# Patient Record
Sex: FEMALE | Race: WHITE | Employment: UNEMPLOYED | URBAN - METROPOLITAN AREA
[De-identification: names, ages, dates, MRNs, and addresses within clinical notes are randomized per-mention and may not be internally consistent; named-entity substitution may affect disease eponyms.]

---

## 2018-05-11 ENCOUNTER — APPOINTMENT (OUTPATIENT)
Dept: GENERAL RADIOLOGY | Age: 53
End: 2018-05-11
Payer: COMMERCIAL

## 2018-05-11 ENCOUNTER — HOSPITAL ENCOUNTER (EMERGENCY)
Age: 53
Discharge: HOME | End: 2018-05-11
Payer: COMMERCIAL

## 2018-05-11 VITALS
BODY MASS INDEX: 47.8 KG/M2 | TEMPERATURE: 98.1 F | RESPIRATION RATE: 16 BRPM | SYSTOLIC BLOOD PRESSURE: 160 MMHG | HEIGHT: 64 IN | OXYGEN SATURATION: 98 % | WEIGHT: 280 LBS | DIASTOLIC BLOOD PRESSURE: 66 MMHG | HEART RATE: 84 BPM

## 2018-05-11 DIAGNOSIS — S60.221A CONTUSION OF RIGHT HAND, INITIAL ENCOUNTER: Primary | ICD-10-CM

## 2018-05-11 PROCEDURE — 73110 X-RAY EXAM OF WRIST: CPT

## 2018-05-11 PROCEDURE — 73130 X-RAY EXAM OF HAND: CPT

## 2018-05-11 PROCEDURE — 6370000000 HC RX 637 (ALT 250 FOR IP): Performed by: NURSE PRACTITIONER

## 2018-05-11 PROCEDURE — 99284 EMERGENCY DEPT VISIT MOD MDM: CPT

## 2018-05-11 RX ORDER — NAPROXEN 500 MG/1
500 TABLET ORAL 2 TIMES DAILY PRN
Qty: 14 TABLET | Refills: 0 | Status: SHIPPED | OUTPATIENT
Start: 2018-05-11 | End: 2019-01-05 | Stop reason: ALTCHOICE

## 2018-05-11 RX ORDER — IBUPROFEN 800 MG/1
800 TABLET ORAL ONCE
Status: COMPLETED | OUTPATIENT
Start: 2018-05-11 | End: 2018-05-11

## 2018-05-11 RX ORDER — CHOLECALCIFEROL (VITAMIN D3) 1250 MCG
50000 CAPSULE ORAL WEEKLY
COMMUNITY

## 2018-05-11 RX ORDER — ATORVASTATIN CALCIUM 10 MG/1
10 TABLET, FILM COATED ORAL DAILY
COMMUNITY
End: 2019-10-01

## 2018-05-11 RX ADMIN — IBUPROFEN 800 MG: 800 TABLET ORAL at 14:38

## 2018-05-11 ASSESSMENT — PAIN DESCRIPTION - PAIN TYPE: TYPE: ACUTE PAIN

## 2018-05-11 ASSESSMENT — PAIN DESCRIPTION - ORIENTATION: ORIENTATION: RIGHT

## 2018-05-11 ASSESSMENT — PAIN SCALES - GENERAL: PAINLEVEL_OUTOF10: 8

## 2018-11-19 ENCOUNTER — HOSPITAL ENCOUNTER (OUTPATIENT)
Age: 53
Discharge: HOME | End: 2018-11-19
Payer: COMMERCIAL

## 2018-11-19 LAB
ALBUMIN SERPL-MCNC: 3.8 G/DL (ref 3.5–5.2)
ALP BLD-CCNC: 80 U/L (ref 35–104)
ALT SERPL-CCNC: 9 U/L (ref 0–32)
ANION GAP SERPL CALCULATED.3IONS-SCNC: 16 MMOL/L (ref 7–16)
AST SERPL-CCNC: 11 U/L (ref 0–31)
BILIRUB SERPL-MCNC: 0.3 MG/DL (ref 0–1.2)
BUN BLDV-MCNC: 8 MG/DL (ref 6–20)
CALCIUM SERPL-MCNC: 9 MG/DL (ref 8.6–10.2)
CHLORIDE BLD-SCNC: 101 MMOL/L (ref 98–107)
CHOLESTEROL, FASTING: 167 MG/DL (ref 0–199)
CO2: 25 MMOL/L (ref 22–29)
CREAT SERPL-MCNC: 0.8 MG/DL (ref 0.5–1)
GFR AFRICAN AMERICAN: >60
GFR NON-AFRICAN AMERICAN: >60 ML/MIN/1.73
GLUCOSE FASTING: 106 MG/DL (ref 74–99)
HCT VFR BLD CALC: 40.8 % (ref 34–48)
HDLC SERPL-MCNC: 38 MG/DL
HEMOGLOBIN: 12.9 G/DL (ref 11.5–15.5)
LDL CHOLESTEROL CALCULATED: 52 MG/DL (ref 0–99)
MCH RBC QN AUTO: 25.7 PG (ref 26–35)
MCHC RBC AUTO-ENTMCNC: 31.6 % (ref 32–34.5)
MCV RBC AUTO: 81.3 FL (ref 80–99.9)
PDW BLD-RTO: 14.6 FL (ref 11.5–15)
PLATELET # BLD: 309 E9/L (ref 130–450)
PMV BLD AUTO: 10.4 FL (ref 7–12)
POTASSIUM SERPL-SCNC: 4.6 MMOL/L (ref 3.5–5)
RBC # BLD: 5.02 E12/L (ref 3.5–5.5)
SODIUM BLD-SCNC: 142 MMOL/L (ref 132–146)
T3 FREE: 3.1 PG/ML (ref 2–4.4)
T4 FREE: 1.03 NG/DL (ref 0.93–1.7)
TOTAL PROTEIN: 7.4 G/DL (ref 6.4–8.3)
TRIGLYCERIDE, FASTING: 386 MG/DL (ref 0–149)
TSH SERPL DL<=0.05 MIU/L-ACNC: 2.53 UIU/ML (ref 0.27–4.2)
VITAMIN D 25-HYDROXY: 28 NG/ML (ref 30–100)
VLDLC SERPL CALC-MCNC: 77 MG/DL
WBC # BLD: 6.5 E9/L (ref 4.5–11.5)

## 2018-11-19 PROCEDURE — 82306 VITAMIN D 25 HYDROXY: CPT

## 2018-11-19 PROCEDURE — 36415 COLL VENOUS BLD VENIPUNCTURE: CPT

## 2018-11-19 PROCEDURE — 85027 COMPLETE CBC AUTOMATED: CPT

## 2018-11-19 PROCEDURE — 80053 COMPREHEN METABOLIC PANEL: CPT

## 2018-11-19 PROCEDURE — 84439 ASSAY OF FREE THYROXINE: CPT

## 2018-11-19 PROCEDURE — 84481 FREE ASSAY (FT-3): CPT

## 2018-11-19 PROCEDURE — 80061 LIPID PANEL: CPT

## 2018-11-19 PROCEDURE — 84443 ASSAY THYROID STIM HORMONE: CPT

## 2019-01-05 ENCOUNTER — APPOINTMENT (OUTPATIENT)
Dept: GENERAL RADIOLOGY | Age: 54
End: 2019-01-05
Payer: COMMERCIAL

## 2019-01-05 ENCOUNTER — HOSPITAL ENCOUNTER (EMERGENCY)
Age: 54
Discharge: HOME | End: 2019-01-05
Attending: EMERGENCY MEDICINE
Payer: COMMERCIAL

## 2019-01-05 VITALS
RESPIRATION RATE: 18 BRPM | SYSTOLIC BLOOD PRESSURE: 158 MMHG | DIASTOLIC BLOOD PRESSURE: 95 MMHG | HEART RATE: 103 BPM | WEIGHT: 280 LBS | TEMPERATURE: 98.4 F | HEIGHT: 63 IN | BODY MASS INDEX: 49.61 KG/M2 | OXYGEN SATURATION: 96 %

## 2019-01-05 DIAGNOSIS — S93.602A SPRAIN OF LEFT FOOT, INITIAL ENCOUNTER: ICD-10-CM

## 2019-01-05 DIAGNOSIS — S93.402A SPRAIN OF LEFT ANKLE, UNSPECIFIED LIGAMENT, INITIAL ENCOUNTER: Primary | ICD-10-CM

## 2019-01-05 PROCEDURE — 73630 X-RAY EXAM OF FOOT: CPT

## 2019-01-05 PROCEDURE — 99284 EMERGENCY DEPT VISIT MOD MDM: CPT

## 2019-01-05 PROCEDURE — 73610 X-RAY EXAM OF ANKLE: CPT

## 2019-01-05 PROCEDURE — G0383 LEV 4 HOSP TYPE B ED VISIT: HCPCS

## 2019-01-05 RX ORDER — IBUPROFEN 800 MG/1
800 TABLET ORAL EVERY 8 HOURS PRN
Qty: 30 TABLET | Refills: 0 | Status: SHIPPED | OUTPATIENT
Start: 2019-01-05 | End: 2019-10-01

## 2019-01-05 RX ORDER — DIVALPROEX SODIUM 250 MG/1
250 TABLET, EXTENDED RELEASE ORAL DAILY
COMMUNITY

## 2019-01-05 RX ORDER — LATANOPROST 50 UG/ML
1 SOLUTION/ DROPS OPHTHALMIC NIGHTLY
COMMUNITY
End: 2021-05-11

## 2019-01-05 ASSESSMENT — ENCOUNTER SYMPTOMS
SINUS PRESSURE: 0
COUGH: 0
EYE REDNESS: 0
DIARRHEA: 0
WHEEZING: 0
SHORTNESS OF BREATH: 0
ABDOMINAL DISTENTION: 0
NAUSEA: 0
VOMITING: 0
BACK PAIN: 0
SORE THROAT: 0
EYE PAIN: 0
EYE DISCHARGE: 0

## 2019-01-05 ASSESSMENT — PAIN DESCRIPTION - LOCATION: LOCATION: ANKLE;FOOT

## 2019-01-05 ASSESSMENT — PAIN DESCRIPTION - PAIN TYPE: TYPE: ACUTE PAIN

## 2019-01-05 ASSESSMENT — PAIN DESCRIPTION - ONSET: ONSET: SUDDEN

## 2019-01-05 ASSESSMENT — PAIN DESCRIPTION - FREQUENCY: FREQUENCY: CONTINUOUS

## 2019-01-05 ASSESSMENT — PAIN DESCRIPTION - PROGRESSION: CLINICAL_PROGRESSION: NOT CHANGED

## 2019-01-05 ASSESSMENT — PAIN SCALES - GENERAL: PAINLEVEL_OUTOF10: 8

## 2019-01-05 ASSESSMENT — PAIN DESCRIPTION - ORIENTATION: ORIENTATION: LEFT

## 2019-02-16 ENCOUNTER — HOSPITAL ENCOUNTER (OUTPATIENT)
Age: 54
Discharge: HOME | End: 2019-02-16
Payer: COMMERCIAL

## 2019-02-16 LAB — VITAMIN B-12: 464 PG/ML (ref 211–946)

## 2019-02-16 PROCEDURE — 36415 COLL VENOUS BLD VENIPUNCTURE: CPT

## 2019-02-16 PROCEDURE — 82607 VITAMIN B-12: CPT

## 2019-03-16 ENCOUNTER — HOSPITAL ENCOUNTER (OUTPATIENT)
Dept: GENERAL RADIOLOGY | Age: 54
Discharge: HOME | End: 2019-03-18
Payer: COMMERCIAL

## 2019-03-16 ENCOUNTER — HOSPITAL ENCOUNTER (OUTPATIENT)
Age: 54
Discharge: HOME | End: 2019-03-18
Payer: COMMERCIAL

## 2019-03-16 DIAGNOSIS — M54.2 CERVICALGIA: ICD-10-CM

## 2019-03-16 DIAGNOSIS — M54.5 LOW BACK PAIN, UNSPECIFIED BACK PAIN LATERALITY, UNSPECIFIED CHRONICITY, WITH SCIATICA PRESENCE UNSPECIFIED: ICD-10-CM

## 2019-03-16 PROCEDURE — 72100 X-RAY EXAM L-S SPINE 2/3 VWS: CPT

## 2019-03-16 PROCEDURE — 72050 X-RAY EXAM NECK SPINE 4/5VWS: CPT

## 2019-03-16 PROCEDURE — 72220 X-RAY EXAM SACRUM TAILBONE: CPT

## 2019-10-01 ENCOUNTER — HOSPITAL ENCOUNTER (EMERGENCY)
Age: 54
Discharge: HOME | End: 2019-10-01
Attending: EMERGENCY MEDICINE
Payer: COMMERCIAL

## 2019-10-01 VITALS
HEIGHT: 64 IN | RESPIRATION RATE: 18 BRPM | HEART RATE: 80 BPM | OXYGEN SATURATION: 98 % | SYSTOLIC BLOOD PRESSURE: 140 MMHG | BODY MASS INDEX: 45.75 KG/M2 | DIASTOLIC BLOOD PRESSURE: 82 MMHG | WEIGHT: 268 LBS | TEMPERATURE: 98.1 F

## 2019-10-01 DIAGNOSIS — H66.001 NON-RECURRENT ACUTE SUPPURATIVE OTITIS MEDIA OF RIGHT EAR WITHOUT SPONTANEOUS RUPTURE OF TYMPANIC MEMBRANE: ICD-10-CM

## 2019-10-01 DIAGNOSIS — H60.391 INFECTIVE OTITIS EXTERNA OF RIGHT EAR: Primary | ICD-10-CM

## 2019-10-01 PROCEDURE — G0381 LEV 2 HOSP TYPE B ED VISIT: HCPCS

## 2019-10-01 RX ORDER — OFLOXACIN 3 MG/ML
5 SOLUTION AURICULAR (OTIC) 3 TIMES DAILY
Qty: 7.5 ML | Refills: 0 | Status: SHIPPED | OUTPATIENT
Start: 2019-10-01 | End: 2019-10-11

## 2019-10-01 RX ORDER — LISINOPRIL 10 MG/1
10 TABLET ORAL DAILY
COMMUNITY

## 2019-10-01 RX ORDER — TRIAMCINOLONE ACETONIDE 5 MG/G
CREAM TOPICAL 3 TIMES DAILY
COMMUNITY
End: 2022-04-01

## 2019-10-01 RX ORDER — CHLORZOXAZONE 500 MG/1
500 TABLET ORAL 3 TIMES DAILY
COMMUNITY

## 2019-10-01 RX ORDER — PERPHENAZINE/AMITRIPTYLINE HCL 2 MG-25 MG
TABLET ORAL
COMMUNITY

## 2019-10-01 RX ORDER — DOXYCYCLINE HYCLATE 100 MG
100 TABLET ORAL 2 TIMES DAILY
Qty: 20 TABLET | Refills: 0 | Status: SHIPPED | OUTPATIENT
Start: 2019-10-01 | End: 2019-10-11

## 2019-10-01 ASSESSMENT — PAIN DESCRIPTION - LOCATION: LOCATION: EAR;JAW

## 2019-10-01 ASSESSMENT — PAIN DESCRIPTION - ONSET: ONSET: PROGRESSIVE

## 2019-10-01 ASSESSMENT — PAIN DESCRIPTION - ORIENTATION: ORIENTATION: RIGHT

## 2019-10-01 ASSESSMENT — PAIN DESCRIPTION - PROGRESSION: CLINICAL_PROGRESSION: GRADUALLY WORSENING

## 2019-10-01 ASSESSMENT — PAIN DESCRIPTION - FREQUENCY: FREQUENCY: CONTINUOUS

## 2019-10-01 ASSESSMENT — PAIN SCALES - GENERAL: PAINLEVEL_OUTOF10: 5

## 2019-10-01 ASSESSMENT — PAIN DESCRIPTION - DESCRIPTORS: DESCRIPTORS: ACHING;THROBBING

## 2019-10-01 ASSESSMENT — PAIN DESCRIPTION - PAIN TYPE: TYPE: ACUTE PAIN

## 2020-08-14 ENCOUNTER — TELEPHONE (OUTPATIENT)
Dept: ADMINISTRATIVE | Age: 55
End: 2020-08-14

## 2020-08-14 NOTE — TELEPHONE ENCOUNTER
Pt's caregiver called and requested to schedule pt with you as a new pt. Her current provider is Dr. Araceli Grigsby. She is requesting a female provider if possible. She has fibromyalgia, asthma, arthritis, and a stutter. She takes Tramadol, Gabapentin, and Ambien that is prescribed from pain management in [facility]. Informed caregiver unsure if able to schedule and would send encounter. Is pt able to establish with you?

## 2020-08-14 NOTE — TELEPHONE ENCOUNTER
The problem is there will come a time she will want me to fill her RX and I will not be able to. So I would say not at this time.  Try Leobardo Figueroa

## 2020-08-14 NOTE — TELEPHONE ENCOUNTER
Spoke with Tamanna Welch and informed him unable to schedule with Cyn Bush. Discussed reasoning of not being able to schedule. He is requesting to possibly schedule pt with Dr. Pete Diaz. Is pt able to establish with him?

## 2020-08-19 NOTE — TELEPHONE ENCOUNTER
Spoke with Gabrielle Cota and let him know unable to establish with Dr. Tera Alva. Gave him phone number for [address] [place] [address] and advised him to contact them to find a new pcp. He was agreeable.

## 2021-05-11 ENCOUNTER — HOSPITAL ENCOUNTER (EMERGENCY)
Age: 56
Discharge: HOME | End: 2021-05-11
Attending: EMERGENCY MEDICINE
Payer: COMMERCIAL

## 2021-05-11 ENCOUNTER — APPOINTMENT (OUTPATIENT)
Dept: GENERAL RADIOLOGY | Age: 56
End: 2021-05-11
Payer: COMMERCIAL

## 2021-05-11 VITALS
HEART RATE: 88 BPM | BODY MASS INDEX: 46.07 KG/M2 | WEIGHT: 260 LBS | HEIGHT: 63 IN | SYSTOLIC BLOOD PRESSURE: 146 MMHG | RESPIRATION RATE: 20 BRPM | DIASTOLIC BLOOD PRESSURE: 84 MMHG | TEMPERATURE: 97.1 F | OXYGEN SATURATION: 97 %

## 2021-05-11 DIAGNOSIS — S82.62XA CLOSED DISPLACED FRACTURE OF LATERAL MALLEOLUS OF LEFT FIBULA, INITIAL ENCOUNTER: Primary | ICD-10-CM

## 2021-05-11 PROCEDURE — 73610 X-RAY EXAM OF ANKLE: CPT

## 2021-05-11 PROCEDURE — 99283 EMERGENCY DEPT VISIT LOW MDM: CPT

## 2021-05-11 ASSESSMENT — ENCOUNTER SYMPTOMS
WHEEZING: 0
SINUS PRESSURE: 0
BACK PAIN: 0
EYE REDNESS: 0
EYE PAIN: 0
ABDOMINAL DISTENTION: 0
SHORTNESS OF BREATH: 0
SORE THROAT: 0
DIARRHEA: 0
EYE DISCHARGE: 0
NAUSEA: 0
COUGH: 0
VOMITING: 0

## 2021-05-11 ASSESSMENT — PAIN DESCRIPTION - LOCATION: LOCATION: ANKLE

## 2021-05-11 ASSESSMENT — PAIN DESCRIPTION - DESCRIPTORS: DESCRIPTORS: ACHING;THROBBING

## 2021-05-11 ASSESSMENT — PAIN SCALES - GENERAL: PAINLEVEL_OUTOF10: 9

## 2021-05-11 ASSESSMENT — PAIN DESCRIPTION - FREQUENCY: FREQUENCY: CONTINUOUS

## 2021-05-11 NOTE — ED PROVIDER NOTES
Tenderness: There is no abdominal tenderness. There is no guarding or rebound. Musculoskeletal:      Left ankle: She exhibits swelling. Tenderness. Lateral malleolus tenderness found. Skin:     General: Skin is warm and dry. Neurological:      Mental Status: She is alert and oriented to person, place, and time. Psychiatric:         Behavior: Behavior normal.         Thought Content: Thought content normal.         Judgment: Judgment normal.        --------------------------------------------- PAST HISTORY ---------------------------------------------  Past Medical History:  has a past medical history of Arthritis, Asthma, Depression, Fibromyalgia, GERD (gastroesophageal reflux disease), Hyperlipidemia, Hypertension, Pernicious anemia, and Vertigo. Past Surgical History:  has a past surgical history that includes Hysterectomy; Gallbladder surgery; Cholecystectomy; fracture surgery; and other surgical history (Right, 09/27/13). Social History:  reports that she has never smoked. She has never used smokeless tobacco. She reports that she does not drink alcohol or use drugs. Family History: family history is not on file. The patients home medications have been reviewed. Allergies: Bee venom, Penicillins, Medrol [methylprednisolone], Hydrochlorothiazide, and Sulfa antibiotics    -------------------------------------------------- RESULTS -------------------------------------------------  No results found for this visit on 05/11/21. XR ANKLE LEFT (MIN 3 VIEWS)   Final Result   1. Nondisplaced distal left fibula fracture. Lateral malleolar soft tissue   swelling and joint effusion. Ankle mortise is maintained. 2.  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 Calcaneal   enthesophytes. ------------------------- NURSING NOTES AND VITALS REVIEWED ---------------------------   The nursing notes within the ED encounter and vital signs as below have been reviewed.    BP (!) 146/84   Pulse 88   Temp 97.1 °F (36.2 °C) (Temporal)   Resp 20   Ht 5' 3\" (1.6 m)   Wt 260 lb (117.9 kg)   SpO2 97%   BMI 46.06 kg/m²   Oxygen Saturation Interpretation: Normal      ------------------------------------------ PROGRESS NOTES ------------------------------------------   I have spoken with the patient and discussed todays results, in addition to providing specific details for the plan of care and counseling regarding the diagnosis and prognosis. Their questions are answered at this time and they are agreeable with the plan.      --------------------------------- ADDITIONAL PROVIDER NOTES ---------------------------------        This patient is stable for discharge. I have shared the specific conditions for return, as well as the importance of follow-up. IMPRESSION:     1. Closed displaced fracture of lateral malleolus of left fibula, initial encounter      Patient's Medications   New Prescriptions    No medications on file   Previous Medications    ALBUTEROL (PROVENTIL HFA;VENTOLIN HFA) 108 (90 BASE) MCG/ACT INHALER    Inhale 2 puffs into the lungs every 6 hours as needed. CHLORZOXAZONE (PARAFON FORTE) 500 MG TABLET    Take 500 mg by mouth 3 times daily    CHOLECALCIFEROL (VITAMIN D3) 08561 UNITS CAPS    Take 50,000 Units by mouth once a week    CYANOCOBALAMIN (B-12) 3000 MCG SUBL    Place under the tongue    DIVALPROEX (DEPAKOTE ER) 250 MG EXTENDED RELEASE TABLET    Take 250 mg by mouth daily    GABAPENTIN (NEURONTIN) 300 MG CAPSULE    3 times daily. LISINOPRIL (PRINIVIL;ZESTRIL) 10 MG TABLET    Take 10 mg by mouth daily    RANITIDINE (ZANTAC) 150 MG TABLET    Take 150 mg by mouth daily as needed     TRAMADOL (ULTRAM) 50 MG TABLET    Take 100 mg by mouth 3 times daily. TRIAMCINOLONE (ARISTOCORT) 0.5 % CREAM    Apply topically 3 times daily Apply topically 3 times daily.    Modified Medications    No medications on file   Discontinued Medications    LATANOPROST (XALATAN) 0.005 % OPHTHALMIC SOLUTION    1 drop nightly       Xr Ankle Left (min 3 Views)    Result Date: 5/11/2021  EXAMINATION: THREE XRAY VIEWS OF THE LEFT ANKLE 5/11/2021 6:21 pm COMPARISON: Left ankle series from January 5, 2019 HISTORY: ORDERING SYSTEM PROVIDED HISTORY: twisted on uneven concrete TECHNOLOGIST PROVIDED HISTORY: Reason for exam:->twisted on uneven concrete FINDINGS: There is a nondisplaced fracture involving the distal aspect of the left fibula. The fracture originates in terminates below the joint line. Ankle mortise is maintained. No additional fractures seen. Mild tibiotalar joint and dorsal midfoot degenerative spurring. Calcaneal enthesophytes. Osseous mineralization is normal.  Lateral malleolar soft tissue swelling and small joint effusion. 1.  Nondisplaced distal left fibula fracture. Lateral malleolar soft tissue swelling and joint effusion. Ankle mortise is maintained. 2.  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 Calcaneal enthesophytes. Patient has walker at home.     Procedures     CRISTINA Ellison DO  05/11/21 1811

## 2021-05-17 DIAGNOSIS — M25.571 ACUTE RIGHT ANKLE PAIN: Primary | ICD-10-CM

## 2021-05-18 ENCOUNTER — OFFICE VISIT (OUTPATIENT)
Dept: ORTHOPEDIC SURGERY | Age: 56
End: 2021-05-18
Payer: COMMERCIAL

## 2021-05-18 VITALS — TEMPERATURE: 98 F | HEIGHT: 64 IN | WEIGHT: 260 LBS | BODY MASS INDEX: 44.39 KG/M2

## 2021-05-18 DIAGNOSIS — S82.832A CLOSED FRACTURE OF DISTAL END OF LEFT FIBULA, UNSPECIFIED FRACTURE MORPHOLOGY, INITIAL ENCOUNTER: Primary | ICD-10-CM

## 2021-05-18 PROCEDURE — G8427 DOCREV CUR MEDS BY ELIG CLIN: HCPCS | Performed by: ORTHOPAEDIC SURGERY

## 2021-05-18 PROCEDURE — 99203 OFFICE O/P NEW LOW 30 MIN: CPT | Performed by: ORTHOPAEDIC SURGERY

## 2021-05-18 PROCEDURE — 27786 TREATMENT OF ANKLE FRACTURE: CPT | Performed by: ORTHOPAEDIC SURGERY

## 2021-05-18 PROCEDURE — G8417 CALC BMI ABV UP PARAM F/U: HCPCS | Performed by: ORTHOPAEDIC SURGERY

## 2021-05-18 PROCEDURE — 1036F TOBACCO NON-USER: CPT | Performed by: ORTHOPAEDIC SURGERY

## 2021-05-18 PROCEDURE — 3017F COLORECTAL CA SCREEN DOC REV: CPT | Performed by: ORTHOPAEDIC SURGERY

## 2021-05-18 RX ORDER — MELATONIN 10 MG
10 CAPSULE ORAL NIGHTLY
COMMUNITY

## 2021-05-18 RX ORDER — ZOLPIDEM TARTRATE 10 MG/1
TABLET ORAL NIGHTLY PRN
COMMUNITY

## 2021-05-18 RX ORDER — FLUOXETINE HYDROCHLORIDE 20 MG/1
60 CAPSULE ORAL DAILY
COMMUNITY

## 2021-05-18 RX ORDER — OMEPRAZOLE 20 MG/1
20 CAPSULE, DELAYED RELEASE ORAL DAILY
COMMUNITY

## 2021-05-18 RX ORDER — ATORVASTATIN CALCIUM 10 MG/1
10 TABLET, FILM COATED ORAL DAILY
COMMUNITY
Start: 2020-12-29

## 2021-05-18 NOTE — PROGRESS NOTES
Karyna Winslow is a 54y.o. year old female who presents today for evaluation of a left ankle injury which occurred on 5/10/2021. The patient reports that this injury occurred when she rolled her ankle. The patient denies any other injuries. Movement makes the pain worse, the splint and resting makes the pain better. The patient's past medical history, medications, and review of systems was reviewed. On Physical Exam, Karyna Winslow is well-developed, well-nourished, and oriented to person, place and time. her gait is intact. On evaluation of her left lower extremity, there is not obvious deformity. There is swelling and is not ecchymosis. she is tender to palpation over the lateral malleolus, and otherwise nontender over the remainder of the extremity. Range of motion is decreased secondary to pain over the left ankle. The skin overlying the left ankle is intact without evidence of lesion, laceration or abrasion. Distal pulses are 2+ and symmetric bilaterally. Sensation is grossly intact to light touch and symmetric bilaterally. Xrays:    1.  Nondisplaced distal left fibula fracture.  Lateral malleolar soft tissue   swelling and joint effusion.  Ankle mortise is maintained.       2.  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  Calcaneal   enthesophytes. Impression:     Diagnosis Orders   1.  Closed fracture of distal end of left fibula, unspecified fracture morphology, initial encounter  Francisco 111 DIST FIBULA FX         Plan:   conservative treatment  wbat  Cam walker boot  Crutches as needed  Ace wrap ice and elevate  Fu in 4 weeks with xr

## 2021-06-16 DIAGNOSIS — S82.832A CLOSED FRACTURE OF DISTAL END OF LEFT FIBULA, UNSPECIFIED FRACTURE MORPHOLOGY, INITIAL ENCOUNTER: Primary | ICD-10-CM

## 2021-06-17 ENCOUNTER — OFFICE VISIT (OUTPATIENT)
Dept: ORTHOPEDIC SURGERY | Age: 56
End: 2021-06-17

## 2021-06-17 VITALS — HEIGHT: 63 IN | WEIGHT: 260 LBS | TEMPERATURE: 98 F | BODY MASS INDEX: 46.07 KG/M2

## 2021-06-17 DIAGNOSIS — S93.492A SPRAIN OF ANTERIOR TALOFIBULAR LIGAMENT OF LEFT ANKLE, INITIAL ENCOUNTER: ICD-10-CM

## 2021-06-17 DIAGNOSIS — S82.832A CLOSED FRACTURE OF DISTAL END OF LEFT FIBULA, UNSPECIFIED FRACTURE MORPHOLOGY, INITIAL ENCOUNTER: Primary | ICD-10-CM

## 2021-06-17 PROCEDURE — 99024 POSTOP FOLLOW-UP VISIT: CPT | Performed by: NURSE PRACTITIONER

## 2021-06-17 RX ORDER — ARIPIPRAZOLE 5 MG/1
TABLET ORAL
COMMUNITY
Start: 2021-06-15

## 2021-06-17 RX ORDER — LANOLIN ALCOHOL/MO/W.PET/CERES
CREAM (GRAM) TOPICAL
COMMUNITY
Start: 2021-05-20

## 2021-06-17 RX ORDER — METHYLPREDNISOLONE 4 MG/1
TABLET ORAL
COMMUNITY
Start: 2021-04-29 | End: 2022-04-01

## 2021-06-17 RX ORDER — LATANOPROST 50 UG/ML
SOLUTION/ DROPS OPHTHALMIC
COMMUNITY
End: 2022-04-01

## 2021-06-17 RX ORDER — GABAPENTIN 400 MG/1
CAPSULE ORAL
COMMUNITY
Start: 2021-05-29 | End: 2022-04-01

## 2021-06-17 RX ORDER — EPINEPHRINE 0.3 MG/.3ML
0.3 INJECTION SUBCUTANEOUS
COMMUNITY

## 2021-06-17 NOTE — PROGRESS NOTES
Idalmis Hamiln is a 64y.o. year old female who presents today for evaluation of a left ankle injury which occurred on 5/10/2021. The patient reports that this injury occurred when she rolled her ankle. She has been in cam walker boot and wbat      The patient's past medical history, medications, and review of systems was reviewed. On Physical Exam, Idalmis Hamlin is well-developed, well-nourished, and oriented to person, place and time. her gait is intact. On evaluation of her left lower extremity, there is not obvious deformity. There is mild swelling and is not ecchymosis. she is tender to palpation over the lateral malleolus, and otherwise nontender over the remainder of the extremity. Range of motion is decreased secondary to pain over the left ankle. The skin overlying the left ankle is intact without evidence of lesion, laceration or abrasion. Distal pulses are 2+ and symmetric bilaterally. Sensation is grossly intact to light touch and symmetric bilaterally. Xrays:    Healing distal fibula fracture    Impression:     Diagnosis Orders   1. Closed fracture of distal end of left fibula, unspecified fracture morphology, initial encounter     2.  Sprain of anterior talofibular ligament of left ankle, initial encounter           Plan:   Dc boot  wbat  aso  Fu in 2 months with xr

## 2021-06-17 NOTE — PATIENT INSTRUCTIONS
sitting, put your feet together flat on the floor. 2. Press your injured foot inward against your other foot. Hold for about 6 seconds, and relax. Repeat 8 to 12 times. 3. Then place the heel of your other foot on top of the injured one. Push down with the top heel while trying to push up with your injured foot. Hold for about 6 seconds, and relax. Repeat 8 to 12 times. Resisted ankle inversion   1. Sit on the floor with your good leg crossed over your other leg. 2. Hold both ends of an exercise band and loop the band around the inside of your affected foot. Then press your other foot against the band. 3. Keeping your legs crossed, slowly push your affected foot against the band so that foot moves away from your other foot. Then slowly relax. 4. Repeat 8 to 12 times. Resisted ankle eversion   1. Sit on the floor with your legs straight. 2. Hold both ends of an exercise band and loop the band around the outside of your affected foot. Then press your other foot against the band. 3. Keeping your leg straight, slowly push your affected foot outward against the band and away from your other foot without letting your leg rotate. Then slowly relax. 4. Repeat 8 to 12 times. Resisted ankle dorsiflexion   1. Tie the ends of an exercise band together to form a loop. Attach one end of the loop to a secure object or shut a door on it to hold it in place. (Or you can have someone hold one end of the loop to provide resistance.)  2. While sitting on the floor or in a chair, loop the other end of the band over the top of your affected foot. 3. Keeping your knee and leg straight, slowly flex your foot to pull back on the exercise band, and then slowly relax. 4. Repeat 8 to 12 times. Single-leg balance   1. Stand on a flat surface with your arms stretched out to your sides like you are making the letter \"T. \" Then lift your good leg off the floor, bending it at the knee.  If you are not steady on your feet, use one hand to hold on to a chair, counter, or wall. 2. Standing on the leg with your affected ankle, keep that knee straight. Try to balance on that leg for up to 30 seconds. Then rest for up to 10 seconds. 3. Repeat 6 to 8 times. 4. When you can balance on your affected leg for 30 seconds with your eyes open, try to balance on it with your eyes closed. 5. When you can do this exercise with your eyes closed for 30 seconds and with ease and no pain, try standing on a pillow or piece of foam, and repeat steps 1 through 4. Follow-up care is a key part of your treatment and safety. Be sure to make and go to all appointments, and call your doctor if you are having problems. It's also a good idea to know your test results and keep a list of the medicines you take. Where can you learn more? Go to https://chpepiceweb.[employer]. org and sign in to your [employer] account. Enter Neelima Del Rosario in the [employer] box to learn more about \"Ankle Sprain: Rehab Exercises. \"     If you do not have an account, please click on the \"Sign Up Now\" link. Current as of: November 16, 2020               Content Version: 12.9  © 2006-2021 Healthwise, Incorporated. Care instructions adapted under license by [facility] ([facility]). If you have questions about a medical condition or this instruction, always ask your healthcare professional. [place] [address] any warranty or liability for your use of this information. Patient Education        Ankle Fracture: Rehab Exercises  Introduction  Here are some examples of exercises for you to try. The exercises may be suggested for a condition or for rehabilitation. Start each exercise slowly. Ease off the exercises if you start to have pain. You will be told when to start these exercises and which ones will work best for you. How to do the exercises  Calf stretch (knee straight)   For this exercise, you will need a towel.   1. Sit with your affected leg straight and supported on the floor. Your other leg should be bent, with that foot flat on the floor. 2. Place a towel around your affected foot just under the toes. 3. Hold one end of the towel in each hand, with your hands above your knees. 4. Pull back gently with the towel so that your foot stretches toward you. 5. Hold the position for at least 15 to 30 seconds. 6. Repeat 2 to 4 times a session, up to 5 sessions a day. Calf stretch (knee bent)   For this exercise, you will need a towel. You will also need a pillow or foam roll. 1. Sit with your affected leg straight and supported on the floor. Your other leg should be bent, with that foot flat on the floor. 2. Place a pillow or foam roll under your affected leg. 3. Place a towel around your affected foot just under the toes. 4. Hold one end of the towel in each hand, with your hands above your knees. 5. Pull back gently with the towel so that your foot stretches toward you. 6. Hold the position for at least 15 to 30 seconds. 7. Repeat 2 to 4 times a session, up to 5 sessions a day. Ankle plantar flexion   1. Sit with your affected leg straight and supported on the floor. Your other leg should be bent, with that foot flat on the floor. 2. Keeping your affected leg straight, gently flex your foot downward so your toes are pointed away from your body. Then slowly relax your foot to the starting position. 3. Repeat 8 to 12 times. Ankle dorsiflexion   1. Sit with your affected leg straight and supported on the floor. Your other leg should be bent, with that foot flat on the floor. 2. Keeping your affected leg straight, gently flex your foot back toward your body so your toes point upward. Then slowly relax your foot to the starting position. 3. Repeat 8 to 12 times. Resisted ankle plantar flexion   For the next four exercises, you will need elastic exercise material, such as surgical tubing or Thera-Band.   1. Sit with your affected leg straight and propped up on a pillow). 2. Lift the heel of your affected foot off the floor, and slowly trace the letters of the alphabet. Heel raises   1. Stand with your feet a few inches apart, with your hands lightly resting on a counter or chair in front of you. 2. Slowly raise your heels off the floor while keeping your knees straight. 3. Hold for about 6 seconds, then slowly lower your heels to the floor. 4. Do 8 to 12 repetitions several times during the day. Follow-up care is a key part of your treatment and safety. Be sure to make and go to all appointments, and call your doctor if you are having problems. It's also a good idea to know your test results and keep a list of the medicines you take. Where can you learn more? Go to https://Play for Jobpevamsieb.[employer]. org and sign in to your [employer] account. Enter T800 in the [employer] box to learn more about \"Ankle Fracture: Rehab Exercises. \"     If you do not have an account, please click on the \"Sign Up Now\" link. Current as of: November 16, 2020               Content Version: 12.9  © 2557-9068 Healthwise, Incorporated. Care instructions adapted under license by [facility] ([facility]). If you have questions about a medical condition or this instruction, always ask your healthcare professional. [address] any warranty or liability for your use of this information.

## 2021-06-26 ENCOUNTER — HOSPITAL ENCOUNTER (OUTPATIENT)
Age: 56
Discharge: HOME | End: 2021-06-26
Payer: COMMERCIAL

## 2021-06-26 LAB
ALBUMIN SERPL-MCNC: 4.2 G/DL (ref 3.5–5.2)
ALP BLD-CCNC: 93 U/L (ref 35–104)
ALT SERPL-CCNC: 18 U/L (ref 0–32)
ANION GAP SERPL CALCULATED.3IONS-SCNC: 17 MMOL/L (ref 7–16)
AST SERPL-CCNC: 21 U/L (ref 0–31)
BASOPHILS ABSOLUTE: 0.15 E9/L (ref 0–0.2)
BASOPHILS RELATIVE PERCENT: 1.7 % (ref 0–2)
BILIRUB SERPL-MCNC: 0.4 MG/DL (ref 0–1.2)
BUN BLDV-MCNC: 19 MG/DL (ref 6–20)
CALCIUM SERPL-MCNC: 9.2 MG/DL (ref 8.6–10.2)
CHLORIDE BLD-SCNC: 102 MMOL/L (ref 98–107)
CHOLESTEROL, TOTAL: 204 MG/DL (ref 0–199)
CO2: 21 MMOL/L (ref 22–29)
CREAT SERPL-MCNC: 1.1 MG/DL (ref 0.5–1)
EOSINOPHILS ABSOLUTE: 0.08 E9/L (ref 0.05–0.5)
EOSINOPHILS RELATIVE PERCENT: 0.9 % (ref 0–6)
GFR AFRICAN AMERICAN: >60
GFR NON-AFRICAN AMERICAN: 51 ML/MIN/1.73
GLUCOSE BLD-MCNC: 96 MG/DL (ref 74–99)
HCT VFR BLD CALC: 42.9 % (ref 34–48)
HDLC SERPL-MCNC: 47 MG/DL
HEMOGLOBIN: 13 G/DL (ref 11.5–15.5)
IMMATURE GRANULOCYTES #: 0.02 E9/L
IMMATURE GRANULOCYTES %: 0.2 % (ref 0–5)
LDL CHOLESTEROL CALCULATED: 87 MG/DL (ref 0–99)
LYMPHOCYTES ABSOLUTE: 3.03 E9/L (ref 1.5–4)
LYMPHOCYTES RELATIVE PERCENT: 35 % (ref 20–42)
MCH RBC QN AUTO: 25.5 PG (ref 26–35)
MCHC RBC AUTO-ENTMCNC: 30.3 % (ref 32–34.5)
MCV RBC AUTO: 84.1 FL (ref 80–99.9)
MONOCYTES ABSOLUTE: 0.44 E9/L (ref 0.1–0.95)
MONOCYTES RELATIVE PERCENT: 5.1 % (ref 2–12)
NEUTROPHILS ABSOLUTE: 4.94 E9/L (ref 1.8–7.3)
NEUTROPHILS RELATIVE PERCENT: 57.1 % (ref 43–80)
PDW BLD-RTO: 14.2 FL (ref 11.5–15)
PLATELET # BLD: 316 E9/L (ref 130–450)
PMV BLD AUTO: 9.9 FL (ref 7–12)
POTASSIUM SERPL-SCNC: 4.3 MMOL/L (ref 3.5–5)
RBC # BLD: 5.1 E12/L (ref 3.5–5.5)
SODIUM BLD-SCNC: 140 MMOL/L (ref 132–146)
TOTAL PROTEIN: 7.6 G/DL (ref 6.4–8.3)
TRIGL SERPL-MCNC: 349 MG/DL (ref 0–149)
TSH SERPL DL<=0.05 MIU/L-ACNC: 2.07 UIU/ML (ref 0.27–4.2)
VITAMIN D 25-HYDROXY: 45 NG/ML (ref 30–100)
VLDLC SERPL CALC-MCNC: 70 MG/DL
WBC # BLD: 8.7 E9/L (ref 4.5–11.5)

## 2021-06-26 PROCEDURE — 82306 VITAMIN D 25 HYDROXY: CPT

## 2021-06-26 PROCEDURE — 84443 ASSAY THYROID STIM HORMONE: CPT

## 2021-06-26 PROCEDURE — 80053 COMPREHEN METABOLIC PANEL: CPT

## 2021-06-26 PROCEDURE — 36415 COLL VENOUS BLD VENIPUNCTURE: CPT

## 2021-06-26 PROCEDURE — 85025 COMPLETE CBC W/AUTO DIFF WBC: CPT

## 2021-06-26 PROCEDURE — 80061 LIPID PANEL: CPT

## 2021-08-19 ENCOUNTER — OFFICE VISIT (OUTPATIENT)
Dept: ORTHOPEDIC SURGERY | Age: 56
End: 2021-08-19
Payer: COMMERCIAL

## 2021-08-19 VITALS — HEIGHT: 63 IN | WEIGHT: 260 LBS | BODY MASS INDEX: 46.07 KG/M2

## 2021-08-19 DIAGNOSIS — S93.492A SPRAIN OF ANTERIOR TALOFIBULAR LIGAMENT OF LEFT ANKLE, INITIAL ENCOUNTER: ICD-10-CM

## 2021-08-19 DIAGNOSIS — S82.832A CLOSED FRACTURE OF DISTAL END OF LEFT FIBULA, UNSPECIFIED FRACTURE MORPHOLOGY, INITIAL ENCOUNTER: Primary | ICD-10-CM

## 2021-08-19 PROCEDURE — 3017F COLORECTAL CA SCREEN DOC REV: CPT | Performed by: NURSE PRACTITIONER

## 2021-08-19 PROCEDURE — 1036F TOBACCO NON-USER: CPT | Performed by: NURSE PRACTITIONER

## 2021-08-19 PROCEDURE — G8417 CALC BMI ABV UP PARAM F/U: HCPCS | Performed by: NURSE PRACTITIONER

## 2021-08-19 PROCEDURE — 99213 OFFICE O/P EST LOW 20 MIN: CPT | Performed by: NURSE PRACTITIONER

## 2021-08-19 PROCEDURE — G8428 CUR MEDS NOT DOCUMENT: HCPCS | Performed by: NURSE PRACTITIONER

## 2021-08-19 NOTE — PROGRESS NOTES
Chief Complaint   Patient presents with    Ankle Pain     Left ankle fracture follow up. DOI 5/10/2021       Shraddha Banegas returns today for follow-up of left ankle pain. She reports that the pain is are improving. The pain is located lateral ankle. She has not been going to physical therapy. Past Medical History:   Diagnosis Date    Arthritis     Asthma     inhaler prn    Depression     Fibromyalgia     GERD (gastroesophageal reflux disease)     Hyperlipidemia     Hypertension     Hx. No Tx for 1 yr.     Pernicious anemia     Vertigo      Past Surgical History:   Procedure Laterality Date    CHOLECYSTECTOMY      FRACTURE SURGERY      left arm/ plate    GALLBLADDER SURGERY      HYSTERECTOMY      OTHER SURGICAL HISTORY Right 09/27/13    chssman-snook procedure, right foot       Current Outpatient Medications:     ARIPiprazole (ABILIFY) 5 MG tablet, , Disp: , Rfl:     gabapentin (NEURONTIN) 400 MG capsule, TAKE ONE CAPSULE BY MOUTH THREE TIMES A DAY, Disp: , Rfl:     EPINEPHrine (EPIPEN 2-WILLIAM) 0.3 MG/0.3ML SOAJ injection, Inject 0.3 mLs into the muscle, Disp: , Rfl:     methylPREDNISolone (MEDROL DOSEPACK) 4 MG tablet, TAKE BY MOUTH  AS DIRECTED, Disp: , Rfl:     latanoprost (XALATAN) 0.005 % ophthalmic solution, Apply to eye, Disp: , Rfl:     vitamin B-6 (PYRIDOXINE) 50 MG tablet, TAKE ONE TABLET BY MOUTH ONCE PER DAY FOR 30 DAYS., Disp: , Rfl:     atorvastatin (LIPITOR) 10 MG tablet, Take 10 mg by mouth daily, Disp: , Rfl:     zolpidem (AMBIEN) 10 MG tablet, Take by mouth nightly as needed for Sleep., Disp: , Rfl:     omeprazole (PRILOSEC) 20 MG delayed release capsule, Take 20 mg by mouth daily, Disp: , Rfl:     FLUoxetine (PROZAC) 20 MG capsule, Take 60 mg by mouth daily, Disp: , Rfl:     melatonin 10 MG CAPS capsule, Take 10 mg by mouth nightly, Disp: , Rfl:     chlorzoxazone (PARAFON FORTE) 500 MG tablet, Take 500 mg by mouth 3 times daily, Disp: , Rfl:     lisinopril (PRINIVIL;ZESTRIL) 10 MG tablet, Take 10 mg by mouth daily, Disp: , Rfl:     triamcinolone (ARISTOCORT) 0.5 % cream, Apply topically 3 times daily Apply topically 3 times daily. , Disp: , Rfl:     Cyanocobalamin (B-12) 3000 MCG SUBL, Place under the tongue, Disp: , Rfl:     divalproex (DEPAKOTE ER) 250 MG extended release tablet, Take 250 mg by mouth daily , Disp: , Rfl:     Cholecalciferol (VITAMIN D3) 15237 units CAPS, Take 50,000 Units by mouth once a week, Disp: , Rfl:     gabapentin (NEURONTIN) 300 MG capsule, 3 times daily. , Disp: , Rfl:     traMADol (ULTRAM) 50 MG tablet, Take 100 mg by mouth 3 times daily. , Disp: , Rfl:     ranitidine (ZANTAC) 150 MG tablet, Take 150 mg by mouth daily as needed , Disp: , Rfl:     albuterol (PROVENTIL HFA;VENTOLIN HFA) 108 (90 BASE) MCG/ACT inhaler, Inhale 2 puffs into the lungs every 6 hours as needed. , Disp: , Rfl:   Allergies   Allergen Reactions    Bee Venom Anaphylaxis    Penicillins Hives and Other (See Comments)     Chest tightening    Medrol [Methylprednisolone] Rash    Hydrochlorothiazide     Sulfa Antibiotics Rash     Social History     Socioeconomic History    Marital status: Single     Spouse name: Not on file    Number of children: Not on file    Years of education: Not on file    Highest education level: Not on file   Occupational History    Not on file   Tobacco Use    Smoking status: Never Smoker    Smokeless tobacco: Never Used   Substance and Sexual Activity    Alcohol use: No    Drug use: No    Sexual activity: Not Currently   Other Topics Concern    Not on file   Social History Narrative    Not on file     Social Determinants of Health     Financial Resource Strain:     Difficulty of Paying Living Expenses:    Food Insecurity:     Worried About Running Out of Food in the Last Year:     [address] in the Last Year:    Transportation Needs:     Lack of Transportation (Medical):      Lack of Transportation (Non-Medical): Physical Activity:     Days of Exercise per Week:     Minutes of Exercise per Session:    Stress:     Feeling of Stress :    Social Connections:     Frequency of Communication with Friends and Family:     Frequency of Social Gatherings with Friends and Family:     Attends [employer] Services:     Active Member of Clubs or Organizations:     Attends Club or Organization Meetings:     Marital Status:    Intimate Partner Violence:     Fear of Current or Ex-Partner:     Emotionally Abused:     Physically Abused:     Sexually Abused:      No family history on file. Review of Systems:     Skin: (-) rash,(-) psoriasis,(-) eczema, (-)skin cancer. Musculoskeletal: (-) fractures,  (-) dislocations,(-) collagen vascular disease, (-) fibromyalgia, (-) multiple sclerosis, (-) muscular dystrophy, (-) RSD,(-) joint pain (-)swelling, (-) joint pain,swelling. Neurologic: (-) epilepsy, (-)seizures,(-) brain tumor,(-) TIA, (-)stroke, (-)headaches, (-)Parkinson disease,(-) memory loss, (-) LOC. Cardiovascular: (-) Chest pain, (-) swelling in legs/feet, (-) SOB, (-) cramping in legs/feet with walking. Constitutional:  The patient is alert and oriented x 3, appears to be stated age and in no distress. Ht 5' 3\" (1.6 m)   Wt 260 lb (117.9 kg)   BMI 46.06 kg/m²     Skin:  Upon inspection: the skin appears warm, dry and intact. There is not a previous scar over the affected area. There is not any cellulitis, lymphedema or cutaneous lesions noted in the lower extremities. Upon palpation there is no induration noted. Neurologic:  Gait: antalgic; Motor exam of the lower extremities show ; quadriceps, hamstrings, foot dorsi and plantar flexors intact R.  5/5 and L. 5/5. Deep tendon reflexes are 2/4 at the knees and 2/4 at the ankles with strong extensor hallicus longus motor strength bilaterally. Sensory to both feet is intact to all sensory roots. Cardiovascular:   The vascular exam is normal and is well perfused to distal extremities. Distal pulses DP/PT: R. 2+; L. 2+. There is cap refill noted less than two seconds in all digits. There is not edema of the bilateral lower extremities. There is not varicosities noted in the distal extremities. Lymph:  Upon palpation,  there is no lymphadenopathy noted in bilateral lower extremities. Musculoskeletal:  Gait: antalgic; examination of the nails and digits reveal no cyanosis or clubbing. Knee exam:  Bilateral knee exam shows;  range of motion of R. Knee is 0 to 120, and L. Knee is 0 to 120. The patient does not have  pain on motion, effusion is mild, there is not tenderness over the  medial, lateral, anterior region, there are not any masses, there is not ligamentous instability, there is not  deformity noted. Knee exam: neither positive for moderate crepitations, some mild tenderness laxity is not noted with  stress. Ankle exam:  Upon inspection and palpation of the Left ankle,  there is not deformity noted,  mild swelling, no ecchymosis, has pain on palpation of lateral nkle. ROM R/L : DF 15/10; PF 35/30;  INV 15/15, CATY 15/15. This exam was compared bilaterally. Right Ankle:   (-) Anterior Drawer ,  (-) Posterior Drawer ,  (-) Squeeze test,  (-) External Rotation, (-) Eversion test , (-) Mooney Test     Left ankle:   (-) Anterior Drawer ,(-)  Posterior Drawer ,(-) Squeeze test,(-) External Rotation (-) Eversion test, (-) Mooney Test.    Foot exam:  visual inspection reveals warm, good capillary refill, there is not pain to palpation over the foot. ROM inversion/eversion full range of motion, abduction/adduction full range of motion, ROM in MTP/PIP/DIP full range of motion. X-Ray:  Lateral malleolar fracture demonstrates widening of the fracture plane   without any significant callus formation.  Delayed union is suspected.  No   widening of the mortise.  Plantar heel spur noted.        Radiographic findings reviewed with patient    Impression:   Encounter Diagnoses   Name Primary?  Closed fracture of distal end of left fibula, unspecified fracture morphology, initial encounter Yes    Sprain of anterior talofibular ligament of left ankle, initial encounter        Plan:  Natural history and expected course discussed. Questions answered. Rest, ice, compression, elevation (RICE) therapy. Crutches and instructions provided. Educational materials distributed.    aso  Hep  Activity as tolerated  Fu in 3 months with xr

## 2021-09-06 ENCOUNTER — HOSPITAL ENCOUNTER (EMERGENCY)
Age: 56
Discharge: HOME | End: 2021-09-06
Attending: EMERGENCY MEDICINE
Payer: COMMERCIAL

## 2021-09-06 VITALS
TEMPERATURE: 97.1 F | WEIGHT: 275 LBS | BODY MASS INDEX: 48.71 KG/M2 | OXYGEN SATURATION: 96 % | RESPIRATION RATE: 20 BRPM | HEART RATE: 75 BPM | DIASTOLIC BLOOD PRESSURE: 83 MMHG | SYSTOLIC BLOOD PRESSURE: 134 MMHG

## 2021-09-06 DIAGNOSIS — J06.9 ACUTE UPPER RESPIRATORY INFECTION: Primary | ICD-10-CM

## 2021-09-06 LAB — SARS-COV-2, NAAT: NOT DETECTED

## 2021-09-06 PROCEDURE — 99282 EMERGENCY DEPT VISIT SF MDM: CPT

## 2021-09-06 PROCEDURE — 87635 SARS-COV-2 COVID-19 AMP PRB: CPT

## 2021-09-06 RX ORDER — DOXYCYCLINE HYCLATE 100 MG
100 TABLET ORAL 2 TIMES DAILY
Qty: 20 TABLET | Refills: 0 | Status: SHIPPED | OUTPATIENT
Start: 2021-09-06 | End: 2021-09-16

## 2021-09-06 RX ORDER — BROMPHENIRAMINE MALEATE, PSEUDOEPHEDRINE HYDROCHLORIDE, AND DEXTROMETHORPHAN HYDROBROMIDE 2; 30; 10 MG/5ML; MG/5ML; MG/5ML
5 SYRUP ORAL 4 TIMES DAILY PRN
Qty: 120 ML | Refills: 0 | Status: SHIPPED | OUTPATIENT
Start: 2021-09-06 | End: 2022-04-01

## 2021-09-06 ASSESSMENT — PAIN DESCRIPTION - PAIN TYPE: TYPE: ACUTE PAIN

## 2021-09-06 ASSESSMENT — ENCOUNTER SYMPTOMS
SHORTNESS OF BREATH: 0
DIARRHEA: 0
SINUS PRESSURE: 0
NAUSEA: 0
EYE REDNESS: 0
EYE DISCHARGE: 0
WHEEZING: 0
ABDOMINAL DISTENTION: 0
EYE PAIN: 0
SORE THROAT: 0
VOMITING: 0
BACK PAIN: 0
RHINORRHEA: 1
COUGH: 1

## 2021-09-06 ASSESSMENT — PAIN DESCRIPTION - DIRECTION: RADIATING_TOWARDS: HURTS TO COUGH

## 2021-09-06 ASSESSMENT — PAIN DESCRIPTION - LOCATION: LOCATION: CHEST

## 2021-09-06 ASSESSMENT — PAIN SCALES - GENERAL: PAINLEVEL_OUTOF10: 5

## 2021-09-06 NOTE — ED PROVIDER NOTES
The history is provided by the patient. URI  Presenting symptoms: congestion, cough and rhinorrhea    Presenting symptoms: no ear pain, no facial pain, no fever and no sore throat    Severity:  Mild  Onset quality:  Gradual  Duration:  3 days  Chronicity:  New  Associated symptoms: no arthralgias, no headaches and no wheezing         Review of Systems   Constitutional: Negative for chills and fever. HENT: Positive for congestion and rhinorrhea. Negative for ear pain, sinus pressure and sore throat. Eyes: Negative for pain, discharge and redness. Respiratory: Positive for cough. Negative for shortness of breath and wheezing. Cardiovascular: Negative for chest pain. Gastrointestinal: Negative for abdominal distention, diarrhea, nausea and vomiting. Genitourinary: Negative for dysuria and frequency. Musculoskeletal: Negative for arthralgias and back pain. Skin: Negative for rash and wound. Neurological: Negative for weakness and headaches. Hematological: Negative for adenopathy. All other systems reviewed and are negative. Physical Exam  Vitals and nursing note reviewed. Constitutional:       Appearance: She is well-developed. HENT:      Head: Normocephalic and atraumatic. Right Ear: Hearing and external ear normal. Tympanic membrane is retracted. Left Ear: Hearing and external ear normal. Tympanic membrane is retracted. Nose: Mucosal edema and congestion present. Mouth/Throat:      Pharynx: Uvula midline. Eyes:      General: Lids are normal.      Conjunctiva/sclera: Conjunctivae normal.      Pupils: Pupils are equal, round, and reactive to light. Cardiovascular:      Rate and Rhythm: Normal rate and regular rhythm. Heart sounds: Normal heart sounds. No murmur heard. Pulmonary:      Effort: Pulmonary effort is normal. No respiratory distress. Breath sounds: Normal breath sounds. No wheezing or rales.    Abdominal:      General: Bowel sounds are normal.      Palpations: Abdomen is soft. Abdomen is not rigid. Tenderness: There is no abdominal tenderness. There is no guarding or rebound. Musculoskeletal:      Cervical back: Normal range of motion and neck supple. Skin:     General: Skin is warm and dry. Findings: No abrasion or rash. Neurological:      Mental Status: She is alert and oriented to person, place, and time. GCS: GCS eye subscore is 4. GCS verbal subscore is 5. GCS motor subscore is 6. Cranial Nerves: No cranial nerve deficit. Sensory: No sensory deficit. Coordination: Coordination normal.      Gait: Gait normal.          Procedures     MDM          --------------------------------------------- PAST HISTORY ---------------------------------------------  Past Medical History:  has a past medical history of Arthritis, Asthma, Depression, Fibromyalgia, GERD (gastroesophageal reflux disease), Hyperlipidemia, Hypertension, Pernicious anemia, and Vertigo. Past Surgical History:  has a past surgical history that includes Hysterectomy; Gallbladder surgery; Cholecystectomy; fracture surgery; and other surgical history (Right, 09/27/13). Social History:  reports that she has never smoked. She has never used smokeless tobacco. She reports that she does not drink alcohol and does not use drugs. Family History: family history is not on file. The patients home medications have been reviewed.     Allergies: Bee venom, Penicillins, Medrol [methylprednisolone], Hydrochlorothiazide, and Sulfa antibiotics    -------------------------------------------------- RESULTS -------------------------------------------------  Labs:  Results for orders placed or performed during the hospital encounter of 09/06/21   COVID-19, Rapid    Specimen: Nasopharyngeal Swab   Result Value Ref Range    SARS-CoV-2, NAAT Not Detected Not Detected       Radiology:  No orders to display       ------------------------- NURSING NOTES AND VITALS REVIEWED ---------------------------  Date / Time Roomed:  9/6/2021 10:34 AM  ED Bed Assignment:  03/03    The nursing notes within the ED encounter and vital signs as below have been reviewed. /83   Pulse 75   Temp 97.1 °F (36.2 °C) (Temporal)   Resp 20   Wt 275 lb (124.7 kg)   SpO2 96%   BMI 48.71 kg/m²   Oxygen Saturation Interpretation: Normal      ------------------------------------------ PROGRESS NOTES ------------------------------------------  I have spoken with the patient and discussed todays results, in addition to providing specific details for the plan of care and counseling regarding the diagnosis and prognosis. Their questions are answered at this time and they are agreeable with the plan. I discussed at length with them reasons for immediate return here for re evaluation. They will followup with primary care by calling their office tomorrow. --------------------------------- ADDITIONAL PROVIDER NOTES ---------------------------------  At this time the patient is without objective evidence of an acute process requiring hospitalization or inpatient management. They have remained hemodynamically stable throughout their entire ED visit and are stable for discharge with outpatient follow-up. The plan has been discussed in detail and they are aware of the specific conditions for emergent return, as well as the importance of follow-up.       New Prescriptions    BROMPHENIRAMINE-PSEUDOEPHEDRINE-DM 2-30-10 MG/5ML SYRUP    Take 5 mLs by mouth 4 times daily as needed for Congestion or Cough    DOXYCYCLINE HYCLATE (VIBRA-TABS) 100 MG TABLET    Take 1 tablet by mouth 2 times daily for 10 days     Patient's Medications   New Prescriptions    BROMPHENIRAMINE-PSEUDOEPHEDRINE-DM 2-30-10 MG/5ML SYRUP    Take 5 mLs by mouth 4 times daily as needed for Congestion or Cough    DOXYCYCLINE HYCLATE (VIBRA-TABS) 100 MG TABLET    Take 1 tablet by mouth 2 times daily for 10 days   Previous Medications Kaushik Modi MD  09/06/21 [id number]

## 2021-10-15 ENCOUNTER — PROCEDURE VISIT (OUTPATIENT)
Dept: AUDIOLOGY | Age: 56
End: 2021-10-15
Payer: COMMERCIAL

## 2021-10-15 ENCOUNTER — OFFICE VISIT (OUTPATIENT)
Dept: ENT CLINIC | Age: 56
End: 2021-10-15
Payer: COMMERCIAL

## 2021-10-15 VITALS
SYSTOLIC BLOOD PRESSURE: 150 MMHG | HEART RATE: 75 BPM | DIASTOLIC BLOOD PRESSURE: 82 MMHG | RESPIRATION RATE: 14 BRPM | WEIGHT: 265 LBS | OXYGEN SATURATION: 97 % | BODY MASS INDEX: 45.24 KG/M2 | HEIGHT: 64 IN

## 2021-10-15 DIAGNOSIS — H93.13 TINNITUS OF BOTH EARS: Primary | ICD-10-CM

## 2021-10-15 DIAGNOSIS — H93.19 TINNITUS, UNSPECIFIED LATERALITY: Primary | ICD-10-CM

## 2021-10-15 DIAGNOSIS — H90.3 SENSORINEURAL HEARING LOSS, BILATERAL: ICD-10-CM

## 2021-10-15 DIAGNOSIS — H90.3 SENSORINEURAL HEARING LOSS (SNHL) OF BOTH EARS: ICD-10-CM

## 2021-10-15 PROCEDURE — G8417 CALC BMI ABV UP PARAM F/U: HCPCS | Performed by: NURSE PRACTITIONER

## 2021-10-15 PROCEDURE — 1036F TOBACCO NON-USER: CPT | Performed by: NURSE PRACTITIONER

## 2021-10-15 PROCEDURE — 99213 OFFICE O/P EST LOW 20 MIN: CPT | Performed by: NURSE PRACTITIONER

## 2021-10-15 PROCEDURE — 3017F COLORECTAL CA SCREEN DOC REV: CPT | Performed by: NURSE PRACTITIONER

## 2021-10-15 PROCEDURE — G8484 FLU IMMUNIZE NO ADMIN: HCPCS | Performed by: NURSE PRACTITIONER

## 2021-10-15 PROCEDURE — G8427 DOCREV CUR MEDS BY ELIG CLIN: HCPCS | Performed by: NURSE PRACTITIONER

## 2021-10-15 PROCEDURE — 92567 TYMPANOMETRY: CPT | Performed by: AUDIOLOGIST

## 2021-10-15 PROCEDURE — 92557 COMPREHENSIVE HEARING TEST: CPT | Performed by: AUDIOLOGIST

## 2021-10-15 NOTE — PROGRESS NOTES
This patient was referred for audiometric/tympanometric testing by Candance Seashore, APRN-CNP due to tinnitus. Audiometry revealed a mild-to-moderate  sensorineural hearing loss, bilaterally. Reliability was fair. Speech reception thresholds were in good agreement with the pure tone averages, bilaterally. Speech discrimination scores were good, in the left ear, and excellent in the right ear. Tympanometry revealed a shallow tympanogram int he left ear and a wide gradient in the right ear. The results were reviewed with the patient. Hearing aids were discussed per ENT recommendation. After all necessary documents are collected a prior authorization will be submitted to the patient's insurance. Patient chose SARAH style hearing aids. Phonak, length 2 , champaign color, rechargeable. Patient will be notified of approval/denial.     Recommendations for follow up will be made pending physician consult.     Jamie Lee AuD

## 2021-10-15 NOTE — PROGRESS NOTES
[id number] [facility] Otolaryngology  Dr. Gail Lara. UJLIAN Porter Ms.Ed. New Consult       Patient Name:  Shady Burns  :  1965     CHIEF C/O:    Chief Complaint   Patient presents with    Tinnitus     NP- onset 2-3 months of ringing. Concerns if medication vs htn. No visual disturbance, no nausea        HISTORY OBTAINED FROM:  patient    HISTORY OF PRESENT ILLNESS:       Lucie Herron is a 64y.o. year old female, here today for ringing in both ears. Symptoms began 6 months ago  Describes sound as a high frequency buzzing sound in both ears  Sound is non-pulsatile and constant  More noticeable when upset or stressed  Doesn't notice hearing loss but family tells her she cant hear  Turns TV up loudly to help cover the noise  Does affect ability to fall asleep  Some hx of noise exposure in the past  No known family hx of hearing loss  Does not recall starting any new medications around the time of onset. Denies frequent NSAID use          Past Medical History:   Diagnosis Date    Arthritis     Asthma     inhaler prn    Depression     Fibromyalgia     GERD (gastroesophageal reflux disease)     Hyperlipidemia     Hypertension     Hx. No Tx for 1 yr.     Pernicious anemia     Vertigo      Past Surgical History:   Procedure Laterality Date    CHOLECYSTECTOMY      FRACTURE SURGERY      left arm/ plate    GALLBLADDER SURGERY      HYSTERECTOMY      OTHER SURGICAL HISTORY Right 13    chssman-snook procedure, right foot       Current Outpatient Medications:     brompheniramine-pseudoephedrine-DM 2-30-10 MG/5ML syrup, Take 5 mLs by mouth 4 times daily as needed for Congestion or Cough, Disp: 120 mL, Rfl: 0    ARIPiprazole (ABILIFY) 5 MG tablet, , Disp: , Rfl:     gabapentin (NEURONTIN) 400 MG capsule, TAKE ONE CAPSULE BY MOUTH THREE TIMES A DAY, Disp: , Rfl:     EPINEPHrine (EPIPEN 2-WILLIAM) 0.3 MG/0.3ML SOAJ injection, Inject 0.3 mLs into the muscle, Disp: , Rfl:     methylPREDNISolone (MEDROL DOSEPACK) 4 MG pertinent family history. Review of Systems   Constitutional: Negative. Negative for activity change and appetite change. HENT: Positive for tinnitus. Negative for ear discharge, ear pain and hearing loss. Eyes: Negative. Respiratory: Negative. Negative for shortness of breath and stridor. Cardiovascular: Negative. Negative for chest pain and palpitations. Endocrine: Negative. Musculoskeletal: Negative. Skin: Negative. Neurological: Negative. Negative for dizziness. Hematological: Negative. Psychiatric/Behavioral: Negative. BP (!) 150/82 (Site: Left Upper Arm, Position: Sitting, Cuff Size: Medium Adult)   Pulse 75   Resp 14   Ht 5' 4\" (1.626 m)   Wt 265 lb (120.2 kg)   SpO2 97%   BMI 45.49 kg/m²   Physical Exam  Constitutional:       Appearance: Normal appearance. HENT:      Head: Normocephalic. Right Ear: Tympanic membrane, ear canal and external ear normal. Decreased hearing noted. Left Ear: Tympanic membrane, ear canal and external ear normal. Decreased hearing noted. Nose: Nose normal. No rhinorrhea. Right Turbinates: Not pale. Left Turbinates: Not pale. Mouth/Throat:      Lips: Pink. Mouth: Mucous membranes are moist.      Dentition: Has dentures. Pharynx: Oropharynx is clear. Eyes:      Conjunctiva/sclera: Conjunctivae normal.      Pupils: Pupils are equal, round, and reactive to light. Cardiovascular:      Rate and Rhythm: Normal rate and regular rhythm. Pulses: Normal pulses. Pulmonary:      Effort: Pulmonary effort is normal. No respiratory distress. Breath sounds: No stridor. Musculoskeletal:         General: Normal range of motion. Cervical back: Normal range of motion. No rigidity. No muscular tenderness. Skin:     General: Skin is warm and dry. Neurological:      General: No focal deficit present. Mental Status: She is alert and oriented to person, place, and time.    Psychiatric: Mood and Affect: Mood normal.         Behavior: Behavior normal.         Thought Content: Thought content normal.         Judgment: Judgment normal.             Audiogram and tympanogram reviewed with patient. Audiogram reveals 35 dB hearing loss bilaterally with 100% discrimination at 75 dB in the right ear, 92% discrimination at 75 dB in the left ear. Tympanogram reveals type a curves bilaterally. IMPRESSION/PLAN:    Luis Enrique Moyer was seen today for tinnitus. Diagnoses and all orders for this visit:    Tinnitus, unspecified laterality  -     YOLANDA Britton, Audiology, [place]    Sensorineural hearing loss (SNHL) of both ears      Patient seen and examined today for bilateral tinnitus. Audiogram is reviewed with patient showing moderate bilateral sensorineural hearing loss. Discussed with patient that due to her moderate hearing loss with good word discrimination she would be a good candidate for hearing replacement with traditional hearing aids. Patient is interested in this time would like to discuss further with audiology. Masking techniques are discussed with the patient for tinnitus including running fans or noise machines at night if bothersome when trying to sleep. Patient will follow up in 6 months for reevaluation of tenderness. She is instructed to call with any new or worsening symptoms prior to her next appointment.         Alexandr Moody, NORMA, FNP-C  [facility], Nose and Throat    The information contained in this note has been dictated using drug and medical speech recognition software and may contain errors

## 2021-10-26 ASSESSMENT — ENCOUNTER SYMPTOMS
SHORTNESS OF BREATH: 0
EYES NEGATIVE: 1
STRIDOR: 0
RESPIRATORY NEGATIVE: 1

## 2021-11-08 DIAGNOSIS — S82.832A CLOSED FRACTURE OF DISTAL END OF LEFT FIBULA, UNSPECIFIED FRACTURE MORPHOLOGY, INITIAL ENCOUNTER: Primary | ICD-10-CM

## 2021-11-10 ENCOUNTER — TELEPHONE (OUTPATIENT)
Dept: ENT CLINIC | Age: 56
End: 2021-11-10

## 2021-11-10 NOTE — TELEPHONE ENCOUNTER
Returned call to patient and explained that we were waiting for the insurance to approve or deny.  Patient understood

## 2021-11-17 ENCOUNTER — OFFICE VISIT (OUTPATIENT)
Dept: ORTHOPEDIC SURGERY | Age: 56
End: 2021-11-17
Payer: COMMERCIAL

## 2021-11-17 VITALS — WEIGHT: 265 LBS | HEIGHT: 64 IN | BODY MASS INDEX: 45.24 KG/M2 | TEMPERATURE: 98 F

## 2021-11-17 DIAGNOSIS — S82.832A CLOSED FRACTURE OF DISTAL END OF LEFT FIBULA, UNSPECIFIED FRACTURE MORPHOLOGY, INITIAL ENCOUNTER: Primary | ICD-10-CM

## 2021-11-17 PROCEDURE — G8427 DOCREV CUR MEDS BY ELIG CLIN: HCPCS | Performed by: NURSE PRACTITIONER

## 2021-11-17 PROCEDURE — G8417 CALC BMI ABV UP PARAM F/U: HCPCS | Performed by: NURSE PRACTITIONER

## 2021-11-17 PROCEDURE — 3017F COLORECTAL CA SCREEN DOC REV: CPT | Performed by: NURSE PRACTITIONER

## 2021-11-17 PROCEDURE — 99212 OFFICE O/P EST SF 10 MIN: CPT | Performed by: NURSE PRACTITIONER

## 2021-11-17 PROCEDURE — G8484 FLU IMMUNIZE NO ADMIN: HCPCS | Performed by: NURSE PRACTITIONER

## 2021-11-17 PROCEDURE — 1036F TOBACCO NON-USER: CPT | Performed by: NURSE PRACTITIONER

## 2021-11-18 NOTE — PROGRESS NOTES
Chief Complaint   Patient presents with    Ankle Pain     Left Ankle FX F/U DOI 05/10/2021       Erasmo Painter returns today for follow-up of left ankle pain. She reports that the pain is improving. She has completed pt and has done hep. She does wear aso while on feet for long periods otherwise does not wear it. Past Medical History:   Diagnosis Date    Arthritis     Asthma     inhaler prn    Depression     Fibromyalgia     GERD (gastroesophageal reflux disease)     Hyperlipidemia     Hypertension     Hx. No Tx for 1 yr.     Pernicious anemia     Vertigo      Past Surgical History:   Procedure Laterality Date    CHOLECYSTECTOMY      FRACTURE SURGERY      left arm/ plate    GALLBLADDER SURGERY      HYSTERECTOMY      OTHER SURGICAL HISTORY Right 09/27/13    chssman-snook procedure, right foot       Current Outpatient Medications:     brompheniramine-pseudoephedrine-DM 2-30-10 MG/5ML syrup, Take 5 mLs by mouth 4 times daily as needed for Congestion or Cough, Disp: 120 mL, Rfl: 0    ARIPiprazole (ABILIFY) 5 MG tablet, , Disp: , Rfl:     gabapentin (NEURONTIN) 400 MG capsule, TAKE ONE CAPSULE BY MOUTH THREE TIMES A DAY, Disp: , Rfl:     EPINEPHrine (EPIPEN 2-WILLIAM) 0.3 MG/0.3ML SOAJ injection, Inject 0.3 mLs into the muscle, Disp: , Rfl:     methylPREDNISolone (MEDROL DOSEPACK) 4 MG tablet, TAKE BY MOUTH  AS DIRECTED, Disp: , Rfl:     latanoprost (XALATAN) 0.005 % ophthalmic solution, Apply to eye, Disp: , Rfl:     vitamin B-6 (PYRIDOXINE) 50 MG tablet, TAKE ONE TABLET BY MOUTH ONCE PER DAY FOR 30 DAYS., Disp: , Rfl:     atorvastatin (LIPITOR) 10 MG tablet, Take 10 mg by mouth daily, Disp: , Rfl:     zolpidem (AMBIEN) 10 MG tablet, Take by mouth nightly as needed for Sleep., Disp: , Rfl:     omeprazole (PRILOSEC) 20 MG delayed release capsule, Take 20 mg by mouth daily, Disp: , Rfl:     FLUoxetine (PROZAC) 20 MG capsule, Take 60 mg by mouth daily, Disp: , Rfl:     melatonin 10 MG CAPS capsule, Take 10 mg by mouth nightly, Disp: , Rfl:     chlorzoxazone (PARAFON FORTE) 500 MG tablet, Take 500 mg by mouth 3 times daily, Disp: , Rfl:     lisinopril (PRINIVIL;ZESTRIL) 10 MG tablet, Take 10 mg by mouth daily, Disp: , Rfl:     triamcinolone (ARISTOCORT) 0.5 % cream, Apply topically 3 times daily Apply topically 3 times daily. , Disp: , Rfl:     Cyanocobalamin (B-12) 3000 MCG SUBL, Place under the tongue, Disp: , Rfl:     divalproex (DEPAKOTE ER) 250 MG extended release tablet, Take 250 mg by mouth daily , Disp: , Rfl:     Cholecalciferol (VITAMIN D3) 54538 units CAPS, Take 50,000 Units by mouth once a week, Disp: , Rfl:     gabapentin (NEURONTIN) 300 MG capsule, 3 times daily. , Disp: , Rfl:     traMADol (ULTRAM) 50 MG tablet, Take 100 mg by mouth 3 times daily. , Disp: , Rfl:     ranitidine (ZANTAC) 150 MG tablet, Take 150 mg by mouth daily as needed , Disp: , Rfl:     albuterol (PROVENTIL HFA;VENTOLIN HFA) 108 (90 BASE) MCG/ACT inhaler, Inhale 2 puffs into the lungs every 6 hours as needed.   , Disp: , Rfl:   Allergies   Allergen Reactions    Bee Venom Anaphylaxis    Penicillins Hives and Other (See Comments)     Chest tightening    Medrol [Methylprednisolone] Rash    Hydrochlorothiazide     Sulfa Antibiotics Rash     Social History     Socioeconomic History    Marital status: Single     Spouse name: Not on file    Number of children: Not on file    Years of education: Not on file    Highest education level: Not on file   Occupational History    Not on file   Tobacco Use    Smoking status: Never Smoker    Smokeless tobacco: Never Used   Substance and Sexual Activity    Alcohol use: No    Drug use: No    Sexual activity: Not Currently   Other Topics Concern    Not on file   Social History Narrative    Not on file     Social Determinants of Health     Financial Resource Strain:     Difficulty of Paying Living Expenses: Not on file   Food Insecurity:     Worried About Running Out of Food in the Last Year: Not on file    Ran Out of Food in the Last Year: Not on file   Transportation Needs:     Lack of Transportation (Medical): Not on file    Lack of Transportation (Non-Medical): Not on file   Physical Activity:     Days of Exercise per Week: Not on file    Minutes of Exercise per Session: Not on file   Stress:     Feeling of Stress : Not on file   Social Connections:     Frequency of Communication with Friends and Family: Not on file    Frequency of Social Gatherings with Friends and Family: Not on file    Attends [employer] Services: Not on file    Active Member of [address] [place] [address] [email or web address] or Organizations: Not on file    Attends Club or Organization Meetings: Not on file    Marital Status: Not on file   Intimate Partner Violence:     Fear of Current or Ex-Partner: Not on file    Emotionally Abused: Not on file    Physically Abused: Not on file    Sexually Abused: Not on file   Housing Stability:     Unable to Pay for Housing in the Last Year: Not on file    Number of [place] in the Last Year: Not on file    Unstable Housing in the Last Year: Not on file     No family history on file. Review of Systems:     Skin: (-) rash,(-) psoriasis,(-) eczema, (-)skin cancer. Musculoskeletal: (-) fractures,  (-) dislocations,(-) collagen vascular disease, (-) fibromyalgia, (-) multiple sclerosis, (-) muscular dystrophy, (-) RSD,(-) joint pain (-)swelling, (-) joint pain,swelling. Neurologic: (-) epilepsy, (-)seizures,(-) brain tumor,(-) TIA, (-)stroke, (-)headaches, (-)Parkinson disease,(-) memory loss, (-) LOC. Cardiovascular: (-) Chest pain, (-) swelling in legs/feet, (-) SOB, (-) cramping in legs/feet with walking. Constitutional:  The patient is alert and oriented x 3, appears to be stated age and in no distress. Temp 98 °F (36.7 °C)   Ht 5' 4\" (1.626 m)   Wt 265 lb (120.2 kg)   BMI 45.49 kg/m²     Skin:  Upon inspection: the skin appears warm, dry and intact.   There is not a previous scar over the affected area. There is not any cellulitis, lymphedema or cutaneous lesions noted in the lower extremities. Upon palpation there is no induration noted. Neurologic:  Gait: antalgic; Motor exam of the lower extremities show ; quadriceps, hamstrings, foot dorsi and plantar flexors intact R.  5/5 and L. 5/5. Deep tendon reflexes are 2/4 at the knees and 2/4 at the ankles with strong extensor hallicus longus motor strength bilaterally. Sensory to both feet is intact to all sensory roots. Cardiovascular: The vascular exam is normal and is well perfused to distal extremities. Distal pulses DP/PT: R. 2+; L. 2+. There is cap refill noted less than two seconds in all digits. There is not edema of the bilateral lower extremities. There is not varicosities noted in the distal extremities. Lymph:  Upon palpation,  there is no lymphadenopathy noted in bilateral lower extremities. Musculoskeletal:  Gait: antalgic; examination of the nails and digits reveal no cyanosis or clubbing. Knee exam:  Bilateral knee exam shows;  range of motion of R. Knee is 0 to 120, and L. Knee is 0 to 120. The patient does not have  pain on motion, effusion is mild, there is not tenderness over the  medial, lateral, anterior region, there are not any masses, there is not ligamentous instability, there is not  deformity noted. Knee exam: neither positive for moderate crepitations, some mild tenderness laxity is not noted with  stress. Ankle exam:  Upon inspection and palpation of the Left ankle,  there is not deformity noted,  No  swelling, no ecchymosis, has no pain on palpation of lateral nkle. ROM R/L : DF 15/10; PF 35/30;  INV 15/15, CATY 15/15. This exam was compared bilaterally.        Right Ankle:   (-) Anterior Drawer ,  (-) Posterior Drawer ,  (-) Squeeze test,  (-) External Rotation, (-) Eversion test , (-) Mooney Test     Left ankle:   (-) Anterior Drawer ,(-)  Posterior Drawer ,(-)

## 2022-01-29 ENCOUNTER — HOSPITAL ENCOUNTER (OUTPATIENT)
Age: 57
Discharge: HOME | End: 2022-01-29
Payer: COMMERCIAL

## 2022-01-29 LAB
ALBUMIN SERPL-MCNC: 3.8 G/DL (ref 3.5–5.2)
ALP BLD-CCNC: 84 U/L (ref 35–104)
ALT SERPL-CCNC: 18 U/L (ref 0–32)
ANION GAP SERPL CALCULATED.3IONS-SCNC: 12 MMOL/L (ref 7–16)
AST SERPL-CCNC: 16 U/L (ref 0–31)
BILIRUB SERPL-MCNC: <0.2 MG/DL (ref 0–1.2)
BUN BLDV-MCNC: 12 MG/DL (ref 6–20)
CALCIUM SERPL-MCNC: 9.1 MG/DL (ref 8.6–10.2)
CHLORIDE BLD-SCNC: 101 MMOL/L (ref 98–107)
CHOLESTEROL, FASTING: 285 MG/DL (ref 0–199)
CO2: 26 MMOL/L (ref 22–29)
CREAT SERPL-MCNC: 1 MG/DL (ref 0.5–1)
GFR AFRICAN AMERICAN: >60
GFR NON-AFRICAN AMERICAN: 57 ML/MIN/1.73
GLUCOSE FASTING: 104 MG/DL (ref 74–99)
HCT VFR BLD CALC: 39 % (ref 34–48)
HDLC SERPL-MCNC: 41 MG/DL
HEMOGLOBIN: 11.8 G/DL (ref 11.5–15.5)
LDL CHOLESTEROL CALCULATED: ABNORMAL MG/DL (ref 0–99)
MCH RBC QN AUTO: 25.1 PG (ref 26–35)
MCHC RBC AUTO-ENTMCNC: 30.3 % (ref 32–34.5)
MCV RBC AUTO: 83 FL (ref 80–99.9)
PDW BLD-RTO: 14.9 FL (ref 11.5–15)
PLATELET # BLD: 252 E9/L (ref 130–450)
PMV BLD AUTO: 10.7 FL (ref 7–12)
POTASSIUM SERPL-SCNC: 4.9 MMOL/L (ref 3.5–5)
RBC # BLD: 4.7 E12/L (ref 3.5–5.5)
SODIUM BLD-SCNC: 139 MMOL/L (ref 132–146)
TOTAL PROTEIN: 7.1 G/DL (ref 6.4–8.3)
TRIGLYCERIDE, FASTING: 625 MG/DL (ref 0–149)
TSH SERPL DL<=0.05 MIU/L-ACNC: 1.76 UIU/ML (ref 0.27–4.2)
VLDLC SERPL CALC-MCNC: ABNORMAL MG/DL
WBC # BLD: 5.9 E9/L (ref 4.5–11.5)

## 2022-01-29 PROCEDURE — 36415 COLL VENOUS BLD VENIPUNCTURE: CPT

## 2022-01-29 PROCEDURE — 80053 COMPREHEN METABOLIC PANEL: CPT

## 2022-01-29 PROCEDURE — 80061 LIPID PANEL: CPT

## 2022-01-29 PROCEDURE — 85027 COMPLETE CBC AUTOMATED: CPT

## 2022-01-29 PROCEDURE — 84443 ASSAY THYROID STIM HORMONE: CPT

## 2022-02-21 ENCOUNTER — PROCEDURE VISIT (OUTPATIENT)
Dept: AUDIOLOGY | Age: 57
End: 2022-02-21

## 2022-02-21 DIAGNOSIS — H90.3 SENSORINEURAL HEARING LOSS, BILATERAL: Primary | ICD-10-CM

## 2022-02-21 DIAGNOSIS — H93.13 TINNITUS OF BOTH EARS: ICD-10-CM

## 2022-02-21 PROCEDURE — 99024 POSTOP FOLLOW-UP VISIT: CPT | Performed by: AUDIOLOGIST

## 2022-02-21 NOTE — PROGRESS NOTES
Fit with binaural  RITE  hearing aids. Instructed in use and care. Gave  battery charger, warranty information and scheduled  2 week check for 3/7. Made following adjustments: ran [facility] [place]. May switch to medium vented domes if needed for retention. Hearing aid contract/battery warning form reviewed and signed. Hearing aids not paired to phone crista at this appointment. Patient was satisfied and will follow up on above date, unless problems arise. No charge visit today. Will bill at 30 day follow up per [facility] guidelines.      Olena Leonard [facility]  [address] [id number]   Electronically signed by Olena Leonard on 2/21/2022 at 4:48 PM

## 2022-03-07 ENCOUNTER — PROCEDURE VISIT (OUTPATIENT)
Dept: AUDIOLOGY | Age: 57
End: 2022-03-07

## 2022-03-07 DIAGNOSIS — H90.3 SENSORINEURAL HEARING LOSS, BILATERAL: Primary | ICD-10-CM

## 2022-03-07 PROCEDURE — 99999 PR OFFICE/OUTPT VISIT,PROCEDURE ONLY: CPT | Performed by: AUDIOLOGIST

## 2022-03-07 NOTE — PROGRESS NOTES
Patient seen today for two week follow-up after hearing aid fitting. She reported she is doing well with the hearing aids and denied any difficulties. Hearing aids are comfortable and are staying in place. Patient feels the volume is comfortable and makes adjustments to volume as needed. Reported a decrease in loudness of tinnitus when wearing the hearing aids. Inquired about how often she should change domes - advised her to change domes if hearing aids sound too quiet or like they are not working at all. Patient asked if she needs to keep follow-up appointment with Amaris Del Real MSN, FNP-C in April or if this can be rescheduled to a later date. Advised her I would provide Marta Homans with an update on her tinnitus and someone from the office would reach out to her. Patient to follow-up in two weeks for [facility] billing.      Filomena Solorzano, [phone]    Electronically signed by Olena Solorzano on 3/7/2022 at 1:47 PM

## 2022-03-21 ENCOUNTER — PROCEDURE VISIT (OUTPATIENT)
Dept: AUDIOLOGY | Age: 57
End: 2022-03-21
Payer: COMMERCIAL

## 2022-03-21 DIAGNOSIS — H90.3 SENSORINEURAL HEARING LOSS, BILATERAL: Primary | ICD-10-CM

## 2022-03-21 PROCEDURE — V5261 HEARING AID, DIGIT, BIN, BTE: HCPCS | Performed by: AUDIOLOGIST

## 2022-03-21 PROCEDURE — V5160 DISPENSING FEE BINAURAL: HCPCS | Performed by: AUDIOLOGIST

## 2022-03-21 NOTE — PROGRESS NOTES
Patient was here for 30 day follow up after hearing aid fitting. She reported doing very well with hearing aids. She also reported improvement in tinnitus. Patient was satisfied and will follow up as needed. Billing completed today.     Olena Arreola [facility]  [address] [facility] [place] [phone]  Electronically signed by Olena Arreola on 3/21/2022 at 3:41 PM

## 2022-04-01 ENCOUNTER — APPOINTMENT (OUTPATIENT)
Dept: GENERAL RADIOLOGY | Age: 57
End: 2022-04-01
Payer: COMMERCIAL

## 2022-04-01 ENCOUNTER — HOSPITAL ENCOUNTER (EMERGENCY)
Age: 57
Discharge: HOME | End: 2022-04-01
Payer: COMMERCIAL

## 2022-04-01 VITALS
TEMPERATURE: 97.3 F | RESPIRATION RATE: 20 BRPM | DIASTOLIC BLOOD PRESSURE: 80 MMHG | WEIGHT: 293 LBS | BODY MASS INDEX: 51.91 KG/M2 | HEART RATE: 70 BPM | OXYGEN SATURATION: 99 % | SYSTOLIC BLOOD PRESSURE: 140 MMHG | HEIGHT: 63 IN

## 2022-04-01 DIAGNOSIS — J20.9 ACUTE BRONCHITIS, UNSPECIFIED ORGANISM: Primary | ICD-10-CM

## 2022-04-01 PROCEDURE — 99283 EMERGENCY DEPT VISIT LOW MDM: CPT

## 2022-04-01 PROCEDURE — 71046 X-RAY EXAM CHEST 2 VIEWS: CPT

## 2022-04-01 RX ORDER — ACETAMINOPHEN 500 MG
1000 TABLET ORAL EVERY 6 HOURS PRN
COMMUNITY

## 2022-04-01 RX ORDER — DOXYCYCLINE HYCLATE 100 MG
100 TABLET ORAL 2 TIMES DAILY
Qty: 14 TABLET | Refills: 0 | Status: SHIPPED | OUTPATIENT
Start: 2022-04-01 | End: 2022-04-08

## 2022-04-01 RX ORDER — BROMPHENIRAMINE MALEATE, PSEUDOEPHEDRINE HYDROCHLORIDE, AND DEXTROMETHORPHAN HYDROBROMIDE 2; 30; 10 MG/5ML; MG/5ML; MG/5ML
5 SYRUP ORAL 4 TIMES DAILY PRN
Qty: 118 ML | Refills: 0 | Status: SHIPPED | OUTPATIENT
Start: 2022-04-01 | End: 2022-04-08

## 2022-04-01 ASSESSMENT — ENCOUNTER SYMPTOMS
SINUS PRESSURE: 0
SHORTNESS OF BREATH: 1
EYE REDNESS: 0
EYE PAIN: 0
COUGH: 1
BACK PAIN: 0
NAUSEA: 0
SORE THROAT: 0
ABDOMINAL DISTENTION: 0
EYE DISCHARGE: 0
DIARRHEA: 0
RHINORRHEA: 1
VOMITING: 0
WHEEZING: 1

## 2022-04-01 ASSESSMENT — PAIN DESCRIPTION - PROGRESSION
CLINICAL_PROGRESSION: NOT CHANGED
CLINICAL_PROGRESSION: NOT CHANGED

## 2022-04-01 ASSESSMENT — PAIN DESCRIPTION - LOCATION: LOCATION: GENERALIZED

## 2022-04-01 ASSESSMENT — PAIN SCALES - GENERAL: PAINLEVEL_OUTOF10: 6

## 2022-04-01 ASSESSMENT — PAIN DESCRIPTION - ONSET: ONSET: ON-GOING

## 2022-04-01 ASSESSMENT — PAIN DESCRIPTION - PAIN TYPE: TYPE: ACUTE PAIN

## 2022-04-01 ASSESSMENT — PAIN DESCRIPTION - DESCRIPTORS: DESCRIPTORS: ACHING

## 2022-04-01 ASSESSMENT — PAIN DESCRIPTION - FREQUENCY: FREQUENCY: CONTINUOUS

## 2022-04-01 NOTE — ED PROVIDER NOTES
Patient presents with fevers, cough, congestion, and shortness of breath. Patient states is been ongoing for the past 10 days. Patient rates her symptoms as moderate in severity. She has attempted to take cold and flu over-the-counter medications without improvement. She states that she initially thought it was allergies however due to the continued course she is now concerned that it may be infectious which is why she presents today. Patient states her fevers were as high as 100.2. She states her cough is productive of a greenish-brown sputum. Her shortness of breath is worse with exertion and better at rest.  She otherwise denies any sinus pain or pressure, chest pain, nausea, vomiting, or abdominal pain. The history is provided by the patient. No  was used. Review of Systems   Constitutional: Positive for fever. Negative for chills. HENT: Positive for congestion and rhinorrhea. Negative for ear pain, sinus pressure and sore throat. Eyes: Negative for pain, discharge and redness. Respiratory: Positive for cough, shortness of breath and wheezing. Cardiovascular: Negative for chest pain. Gastrointestinal: Negative for abdominal distention, diarrhea, nausea and vomiting. Genitourinary: Negative for dysuria and frequency. Musculoskeletal: Negative for arthralgias and back pain. Skin: Negative for rash and wound. Neurological: Negative for weakness and headaches. Hematological: Negative for adenopathy. All other systems reviewed and are negative. Physical Exam  Vitals and nursing note reviewed. Constitutional:       General: She is not in acute distress. Appearance: She is well-developed. She is obese. HENT:      Head: Normocephalic and atraumatic. Nose: Congestion and rhinorrhea present. Eyes:      Conjunctiva/sclera: Conjunctivae normal.   Cardiovascular:      Rate and Rhythm: Normal rate and regular rhythm.       Heart sounds: Normal heart sounds. No murmur heard. Pulmonary:      Effort: Pulmonary effort is normal. No respiratory distress. Breath sounds: Normal breath sounds. No wheezing or rales. Abdominal:      General: Bowel sounds are normal.      Palpations: Abdomen is soft. Tenderness: There is no abdominal tenderness. There is no guarding or rebound. Musculoskeletal:      Cervical back: Normal range of motion and neck supple. Skin:     General: Skin is warm and dry. Neurological:      Mental Status: She is alert and oriented to person, place, and time. Cranial Nerves: No cranial nerve deficit. Coordination: Coordination normal.          Procedures     MDM  Number of Diagnoses or Management Options  Diagnosis management comments: Patient presents with cough and fevers. Chest x-ray was obtained which did not show any obvious pneumonia. However given patient's comorbidities and the length of her symptoms, patient will be given a prescription for doxycycline and advised to follow-up with her PCP for further evaluation care. Patient was informed the results and plan is agreeable. Patient discharged. Amount and/or Complexity of Data Reviewed  Tests in the radiology section of CPT®: reviewed                    --------------------------------------------- PAST HISTORY ---------------------------------------------  Past Medical History:  has a past medical history of Arthritis, Asthma, Depression, Fibromyalgia, GERD (gastroesophageal reflux disease), Hyperlipidemia, Hypertension, Pernicious anemia, and Vertigo. Past Surgical History:  has a past surgical history that includes Hysterectomy; Gallbladder surgery; Cholecystectomy; fracture surgery; and other surgical history (Right, 09/27/13). Social History:  reports that she has never smoked. She has never used smokeless tobacco. She reports that she does not drink alcohol and does not use drugs. Family History: family history is not on file. The patients home medications have been reviewed. Allergies: Bee venom, Penicillins, Medrol [methylprednisolone], Ciprofloxacin, Hydrochlorothiazide, and Sulfa antibiotics    -------------------------------------------------- RESULTS -------------------------------------------------  Labs:  No results found for this visit on 04/01/22. Radiology:  XR CHEST (2 VW)   Final Result   No acute process. ------------------------- NURSING NOTES AND VITALS REVIEWED ---------------------------  Date / Time Roomed:  4/1/2022  8:39 AM  ED Bed Assignment:  02/02    The nursing notes within the ED encounter and vital signs as below have been reviewed. BP (!) 140/80   Pulse 70   Temp 97.3 °F (36.3 °C) (Temporal)   Resp 20   Ht 5' 3\" (1.6 m)   Wt 300 lb (136.1 kg)   SpO2 99%   BMI 53.14 kg/m²   Oxygen Saturation Interpretation: Normal      ------------------------------------------ PROGRESS NOTES ------------------------------------------  9:13 AM EDT  I have spoken with the patient and discussed todays results, in addition to providing specific details for the plan of care and counseling regarding the diagnosis and prognosis. Their questions are answered at this time and they are agreeable with the plan. I discussed at length with them reasons for immediate return here for re evaluation. They will followup with their primary care physician by calling their office tomorrow. --------------------------------- ADDITIONAL PROVIDER NOTES ---------------------------------  At this time the patient is without objective evidence of an acute process requiring hospitalization or inpatient management. They have remained hemodynamically stable throughout their entire ED visit and are stable for discharge with outpatient follow-up. The plan has been discussed in detail and they are aware of the specific conditions for emergent return, as well as the importance of follow-up.       New Prescriptions BROMPHENIRAMINE-PSEUDOEPHEDRINE-DM 2-30-10 MG/5ML SYRUP    Take 5 mLs by mouth 4 times daily as needed for Congestion or Cough    DOXYCYCLINE HYCLATE (VIBRA-TABS) 100 MG TABLET    Take 1 tablet by mouth 2 times daily for 7 days       Diagnosis:  1. Acute bronchitis, unspecified organism        Disposition:  Patient's disposition: Discharge to home  Patient's condition is stable.            [facility],   Resident  04/01/22 [id number]

## 2022-04-06 ENCOUNTER — TELEPHONE (OUTPATIENT)
Dept: AUDIOLOGY | Age: 57
End: 2022-04-06

## 2022-04-06 NOTE — TELEPHONE ENCOUNTER
Received VM from patient stating she would like to rescheduled her appointment with Hillary Mock on 4/15/2022. Returned her call and offered to transfer her to ENT. Advised her audiology does not do scheduling for ENT. Transferred pt to ENT pre-service.

## 2022-04-15 ENCOUNTER — HOSPITAL ENCOUNTER (EMERGENCY)
Age: 57
Discharge: HOME | End: 2022-04-15
Attending: EMERGENCY MEDICINE
Payer: COMMERCIAL

## 2022-04-15 VITALS
HEART RATE: 92 BPM | DIASTOLIC BLOOD PRESSURE: 94 MMHG | OXYGEN SATURATION: 98 % | WEIGHT: 276 LBS | SYSTOLIC BLOOD PRESSURE: 164 MMHG | RESPIRATION RATE: 20 BRPM | BODY MASS INDEX: 47.12 KG/M2 | HEIGHT: 64 IN | TEMPERATURE: 97.3 F

## 2022-04-15 DIAGNOSIS — K52.9 GASTROENTERITIS: Primary | ICD-10-CM

## 2022-04-15 PROCEDURE — 99283 EMERGENCY DEPT VISIT LOW MDM: CPT

## 2022-04-15 RX ORDER — LOPERAMIDE HYDROCHLORIDE 2 MG/1
2 CAPSULE ORAL 4 TIMES DAILY PRN
Qty: 8 CAPSULE | Refills: 0 | Status: SHIPPED | OUTPATIENT
Start: 2022-04-15 | End: 2022-04-23

## 2022-04-15 RX ORDER — ONDANSETRON 4 MG/1
4 TABLET, ORALLY DISINTEGRATING ORAL EVERY 8 HOURS PRN
Qty: 6 TABLET | Refills: 0 | Status: SHIPPED | OUTPATIENT
Start: 2022-04-15 | End: 2022-04-21

## 2022-04-15 ASSESSMENT — ENCOUNTER SYMPTOMS
NAUSEA: 1
SORE THROAT: 0
ABDOMINAL DISTENTION: 0
COUGH: 0
ABDOMINAL PAIN: 1
BACK PAIN: 0
WHEEZING: 0
VOMITING: 1
EYE PAIN: 0
DIARRHEA: 1
SHORTNESS OF BREATH: 0
SINUS PRESSURE: 0
EYE DISCHARGE: 0
EYE REDNESS: 0

## 2022-04-15 NOTE — ED PROVIDER NOTES
Grandson, a teen, is sick at home with gastro symptoms, now she thinks she may have contracted his illness    The history is provided by the patient. GI Problem  The primary symptoms include fatigue, abdominal pain, nausea, vomiting and diarrhea. Primary symptoms do not include fever, dysuria, arthralgias or rash. The illness began 2 days ago. The problem has been gradually improving. The illness is also significant for anorexia. The illness does not include chills or back pain. Review of Systems   Constitutional: Positive for activity change, appetite change and fatigue. Negative for chills and fever. HENT: Negative for ear pain, sinus pressure and sore throat. Eyes: Negative for pain, discharge and redness. Respiratory: Negative for cough, shortness of breath and wheezing. Cardiovascular: Negative for chest pain. Gastrointestinal: Positive for abdominal pain, anorexia, diarrhea, nausea and vomiting. Negative for abdominal distention. Genitourinary: Negative for dysuria and frequency. Musculoskeletal: Negative for arthralgias and back pain. Skin: Negative for rash and wound. Neurological: Negative for weakness and headaches. Hematological: Negative for adenopathy. Psychiatric/Behavioral: Negative. All other systems reviewed and are negative. Physical Exam  Vitals and nursing note reviewed. Constitutional:       Appearance: She is well-developed. HENT:      Head: Normocephalic and atraumatic. Right Ear: Tympanic membrane normal.      Left Ear: Tympanic membrane normal.   Eyes:      Pupils: Pupils are equal, round, and reactive to light. Cardiovascular:      Rate and Rhythm: Normal rate and regular rhythm. Heart sounds: Normal heart sounds. No murmur heard. Pulmonary:      Effort: Pulmonary effort is normal.      Breath sounds: Normal breath sounds. Abdominal:      General: Bowel sounds are increased. Palpations: Abdomen is soft. Tenderness: There is abdominal tenderness in the epigastric area. There is no guarding or rebound. Musculoskeletal:      Cervical back: Normal range of motion and neck supple. Skin:     General: Skin is warm and dry. Neurological:      Mental Status: She is alert and oriented to person, place, and time. Psychiatric:         Behavior: Behavior normal.         Thought Content: Thought content normal.         Judgment: Judgment normal.      .ers  Patient's Medications   New Prescriptions    LOPERAMIDE (RA ANTI-DIARRHEAL) 2 MG CAPSULE    Take 1 capsule by mouth 4 times daily as needed for Diarrhea    ONDANSETRON (ZOFRAN ODT) 4 MG DISINTEGRATING TABLET    Take 1 tablet by mouth every 8 hours as needed for Nausea or Vomiting   Previous Medications    ACETAMINOPHEN (TYLENOL) 500 MG TABLET    Take 1,000 mg by mouth every 6 hours as needed for Pain    ALBUTEROL (PROVENTIL HFA;VENTOLIN HFA) 108 (90 BASE) MCG/ACT INHALER    Inhale 2 puffs into the lungs every 6 hours as needed. ARIPIPRAZOLE (ABILIFY) 5 MG TABLET        ATORVASTATIN (LIPITOR) 10 MG TABLET    Take 10 mg by mouth daily    CHLORZOXAZONE (PARAFON FORTE) 500 MG TABLET    Take 500 mg by mouth 3 times daily    CHOLECALCIFEROL (VITAMIN D3) 15160 UNITS CAPS    Take 50,000 Units by mouth once a week    CYANOCOBALAMIN (B-12) 3000 MCG SUBL    Place under the tongue    DIVALPROEX (DEPAKOTE ER) 250 MG EXTENDED RELEASE TABLET    Take 250 mg by mouth daily     EPINEPHRINE (EPIPEN 2-WILLIAM) 0.3 MG/0.3ML SOAJ INJECTION    Inject 0.3 mLs into the muscle    FLUOXETINE (PROZAC) 20 MG CAPSULE    Take 60 mg by mouth daily    GABAPENTIN (NEURONTIN) 300 MG CAPSULE    600 mg 3 times daily.      LISINOPRIL (PRINIVIL;ZESTRIL) 10 MG TABLET    Take 10 mg by mouth daily    MELATONIN 10 MG CAPS CAPSULE    Take 10 mg by mouth nightly    OMEPRAZOLE (PRILOSEC) 20 MG DELAYED RELEASE CAPSULE    Take 20 mg by mouth daily    TRAMADOL (ULTRAM) 50 MG TABLET    Take 100 mg by mouth 3 times daily.     VITAMIN B-6 (PYRIDOXINE) 50 MG TABLET    TAKE ONE TABLET BY MOUTH ONCE PER DAY FOR 30 DAYS. ZOLPIDEM (AMBIEN) 10 MG TABLET    Take by mouth nightly as needed for Sleep. Modified Medications    No medications on file   Discontinued Medications    No medications on file     --------------------------------------------- PAST HISTORY ---------------------------------------------  Past Medical History:  has a past medical history of Arthritis, Asthma, Depression, Fibromyalgia, GERD (gastroesophageal reflux disease), Hyperlipidemia, Hypertension, Pernicious anemia, and Vertigo. Past Surgical History:  has a past surgical history that includes Hysterectomy; Gallbladder surgery; Cholecystectomy; fracture surgery; and other surgical history (Right, 09/27/13). Social History:  reports that she has never smoked. She has never used smokeless tobacco. She reports that she does not drink alcohol and does not use drugs. Family History: family history is not on file. The patients home medications have been reviewed. Allergies: Bee venom, Penicillins, Medrol [methylprednisolone], Ciprofloxacin, Hydrochlorothiazide, and Sulfa antibiotics    -------------------------------------------------- RESULTS -------------------------------------------------  No results found for this visit on 04/15/22. No orders to display       ------------------------- NURSING NOTES AND VITALS REVIEWED ---------------------------   The nursing notes within the ED encounter and vital signs as below have been reviewed.    BP (!) 164/94   Pulse 92   Temp 97.3 °F (36.3 °C) (Temporal)   Resp 20   Ht 5' 4\" (1.626 m)   Wt 276 lb (125.2 kg)   SpO2 98%   BMI 47.38 kg/m²   Oxygen Saturation Interpretation: Normal      ------------------------------------------ PROGRESS NOTES ------------------------------------------   I have spoken with the patient and discussed todays results, in addition to providing specific details for the

## 2022-05-04 ENCOUNTER — OFFICE VISIT (OUTPATIENT)
Dept: ENT CLINIC | Age: 57
End: 2022-05-04
Payer: COMMERCIAL

## 2022-05-04 VITALS — HEIGHT: 64 IN | BODY MASS INDEX: 47.12 KG/M2 | WEIGHT: 276 LBS

## 2022-05-04 DIAGNOSIS — H93.19 TINNITUS, UNSPECIFIED LATERALITY: Primary | ICD-10-CM

## 2022-05-04 DIAGNOSIS — H90.3 SENSORINEURAL HEARING LOSS (SNHL) OF BOTH EARS: ICD-10-CM

## 2022-05-04 PROCEDURE — G8427 DOCREV CUR MEDS BY ELIG CLIN: HCPCS | Performed by: NURSE PRACTITIONER

## 2022-05-04 PROCEDURE — 1036F TOBACCO NON-USER: CPT | Performed by: NURSE PRACTITIONER

## 2022-05-04 PROCEDURE — 99213 OFFICE O/P EST LOW 20 MIN: CPT | Performed by: NURSE PRACTITIONER

## 2022-05-04 PROCEDURE — G8417 CALC BMI ABV UP PARAM F/U: HCPCS | Performed by: NURSE PRACTITIONER

## 2022-05-04 PROCEDURE — 3017F COLORECTAL CA SCREEN DOC REV: CPT | Performed by: NURSE PRACTITIONER

## 2022-05-04 ASSESSMENT — ENCOUNTER SYMPTOMS
STRIDOR: 0
SHORTNESS OF BREATH: 0
RESPIRATORY NEGATIVE: 1
EYES NEGATIVE: 1

## 2022-05-04 NOTE — PROGRESS NOTES
[facility] Otolaryngology  Dr. Martin Mccarty. Tammie Caecres. Ms.Ed        Patient Name:  Divya Elizondo  :  1965     CHIEF C/O:    Chief Complaint   Patient presents with    Follow-up     6 month follow up BL tinnitus. HISTORY OBTAINED FROM:  patient    HISTORY OF PRESENT ILLNESS:       Drew Andino is a 64y.o. year old female, here today for follow up of tinnitus and hearing loss. Last seen 6 months ago. Received hearing aids in March, doing well and tolerating  States tinnitus is far less noticeable with hearing aids  Using masking techniques when hearing aids out  No new ear pain or pressure  No recent ear infections  No recent fevers or antibiotics  No new dizziness. Past Medical History:   Diagnosis Date    Arthritis     Asthma     inhaler prn    Depression     Fibromyalgia     GERD (gastroesophageal reflux disease)     Hyperlipidemia     Hypertension     Hx. No Tx for 1 yr.     Pernicious anemia     Vertigo      Past Surgical History:   Procedure Laterality Date    CHOLECYSTECTOMY      FRACTURE SURGERY      left arm/ plate    GALLBLADDER SURGERY      HYSTERECTOMY      OTHER SURGICAL HISTORY Right 13    chssman-snook procedure, right foot       Current Outpatient Medications:     acetaminophen (TYLENOL) 500 MG tablet, Take 1,000 mg by mouth every 6 hours as needed for Pain, Disp: , Rfl:     ARIPiprazole (ABILIFY) 5 MG tablet, , Disp: , Rfl:     EPINEPHrine (EPIPEN 2-WILLIAM) 0.3 MG/0.3ML SOAJ injection, Inject 0.3 mLs into the muscle, Disp: , Rfl:     vitamin B-6 (PYRIDOXINE) 50 MG tablet, TAKE ONE TABLET BY MOUTH ONCE PER DAY FOR 30 DAYS., Disp: , Rfl:     atorvastatin (LIPITOR) 10 MG tablet, Take 10 mg by mouth daily, Disp: , Rfl:     zolpidem (AMBIEN) 10 MG tablet, Take by mouth nightly as needed for Sleep., Disp: , Rfl:     omeprazole (PRILOSEC) 20 MG delayed release capsule, Take 20 mg by mouth daily, Disp: , Rfl:     FLUoxetine (PROZAC) 20 MG capsule, Take 60 mg by mouth daily, Disp: , Rfl:     melatonin 10 MG CAPS capsule, Take 10 mg by mouth nightly, Disp: , Rfl:     chlorzoxazone (PARAFON FORTE) 500 MG tablet, Take 500 mg by mouth 3 times daily, Disp: , Rfl:     lisinopril (PRINIVIL;ZESTRIL) 10 MG tablet, Take 10 mg by mouth daily, Disp: , Rfl:     Cyanocobalamin (B-12) 3000 MCG SUBL, Place under the tongue, Disp: , Rfl:     divalproex (DEPAKOTE ER) 250 MG extended release tablet, Take 250 mg by mouth daily , Disp: , Rfl:     Cholecalciferol (VITAMIN D3) 50186 units CAPS, Take 50,000 Units by mouth once a week, Disp: , Rfl:     gabapentin (NEURONTIN) 300 MG capsule, 600 mg 3 times daily. , Disp: , Rfl:     traMADol (ULTRAM) 50 MG tablet, Take 100 mg by mouth 3 times daily. , Disp: , Rfl:     albuterol (PROVENTIL HFA;VENTOLIN HFA) 108 (90 BASE) MCG/ACT inhaler, Inhale 2 puffs into the lungs every 6 hours as needed. , Disp: , Rfl:   Bee venom, Penicillins, Medrol [methylprednisolone], Ciprofloxacin, Hydrochlorothiazide, and Sulfa antibiotics  Social History     Tobacco Use    Smoking status: Never Smoker    Smokeless tobacco: Never Used   Substance Use Topics    Alcohol use: No    Drug use: No     History reviewed. No pertinent family history. Review of Systems   Constitutional: Negative. Negative for activity change and appetite change. HENT: Positive for hearing loss and tinnitus. Negative for ear discharge and ear pain. Eyes: Negative. Respiratory: Negative. Negative for shortness of breath and stridor. Cardiovascular: Negative. Negative for chest pain and palpitations. Endocrine: Negative. Musculoskeletal: Negative. Skin: Negative. Neurological: Negative. Negative for dizziness. Hematological: Negative. Psychiatric/Behavioral: Negative. Ht 5' 4\" (1.626 m)   Wt 276 lb (125.2 kg)   BMI 47.38 kg/m²   Physical Exam  Constitutional:       Appearance: Normal appearance. HENT:      Head: Normocephalic.       Right Ear: Tympanic membrane, ear canal and external ear normal. Decreased hearing noted. Left Ear: Tympanic membrane, ear canal and external ear normal. Decreased hearing noted. Nose: Nose normal. No rhinorrhea. Right Turbinates: Not pale. Left Turbinates: Not pale. Mouth/Throat:      Lips: Pink. Mouth: Mucous membranes are moist.      Dentition: Has dentures. Pharynx: Oropharynx is clear. Eyes:      Conjunctiva/sclera: Conjunctivae normal.      Pupils: Pupils are equal, round, and reactive to light. Cardiovascular:      Rate and Rhythm: Normal rate and regular rhythm. Pulses: Normal pulses. Pulmonary:      Effort: Pulmonary effort is normal. No respiratory distress. Breath sounds: No stridor. Musculoskeletal:         General: Normal range of motion. Cervical back: Normal range of motion. No rigidity. No muscular tenderness. Skin:     General: Skin is warm and dry. Neurological:      General: No focal deficit present. Mental Status: She is alert and oriented to person, place, and time. Psychiatric:         Mood and Affect: Mood normal.         Behavior: Behavior normal.         Thought Content: Thought content normal.         Judgment: Judgment normal.         IMPRESSION/PLAN:    Srikanth was seen today for follow-up. Diagnoses and all orders for this visit:    Tinnitus, unspecified laterality    Sensorineural hearing loss (SNHL) of both ears        Patient is seen and examined today for follow-up of bilateral sensorineural hearing loss and tinnitus. She states she has had her hearing aids for approximately 6 to 8 weeks and is doing well at this time. She will follow-up in 6 months with full audio.   She is instructed to call with any new or worsening symptoms prior to her next appointment      Eloisa Sutton, MSN, FNP-C  8 [facility], Nose and Throat    The information contained in this note has been dictated using drug and medical speech recognition software and may contain errors

## 2022-11-04 ENCOUNTER — OFFICE VISIT (OUTPATIENT)
Dept: ENT CLINIC | Age: 57
End: 2022-11-04
Payer: MEDICARE

## 2022-11-04 ENCOUNTER — PROCEDURE VISIT (OUTPATIENT)
Dept: AUDIOLOGY | Age: 57
End: 2022-11-04
Payer: MEDICARE

## 2022-11-04 VITALS
BODY MASS INDEX: 47.12 KG/M2 | HEIGHT: 64 IN | WEIGHT: 276 LBS | SYSTOLIC BLOOD PRESSURE: 113 MMHG | DIASTOLIC BLOOD PRESSURE: 76 MMHG | HEART RATE: 66 BPM

## 2022-11-04 DIAGNOSIS — H93.13 TINNITUS OF BOTH EARS: ICD-10-CM

## 2022-11-04 DIAGNOSIS — H90.3 SENSORINEURAL HEARING LOSS (SNHL) OF BOTH EARS: Primary | ICD-10-CM

## 2022-11-04 DIAGNOSIS — H90.3 SENSORINEURAL HEARING LOSS, BILATERAL: Primary | ICD-10-CM

## 2022-11-04 PROCEDURE — 3017F COLORECTAL CA SCREEN DOC REV: CPT | Performed by: NURSE PRACTITIONER

## 2022-11-04 PROCEDURE — G8417 CALC BMI ABV UP PARAM F/U: HCPCS | Performed by: NURSE PRACTITIONER

## 2022-11-04 PROCEDURE — 99213 OFFICE O/P EST LOW 20 MIN: CPT | Performed by: NURSE PRACTITIONER

## 2022-11-04 PROCEDURE — 92567 TYMPANOMETRY: CPT | Performed by: AUDIOLOGIST

## 2022-11-04 PROCEDURE — 92557 COMPREHENSIVE HEARING TEST: CPT | Performed by: AUDIOLOGIST

## 2022-11-04 PROCEDURE — 1036F TOBACCO NON-USER: CPT | Performed by: NURSE PRACTITIONER

## 2022-11-04 PROCEDURE — G8484 FLU IMMUNIZE NO ADMIN: HCPCS | Performed by: NURSE PRACTITIONER

## 2022-11-04 PROCEDURE — G8427 DOCREV CUR MEDS BY ELIG CLIN: HCPCS | Performed by: NURSE PRACTITIONER

## 2022-11-04 ASSESSMENT — ENCOUNTER SYMPTOMS
STRIDOR: 0
SHORTNESS OF BREATH: 0
EYES NEGATIVE: 1
RESPIRATORY NEGATIVE: 1

## 2022-11-04 NOTE — PROGRESS NOTES
This patient was referred for audiometric and tympanometric testing by JONATHAN Ortega due to established hearing loss. Patient currently wears bilateral hearing aids. She denied any changes to hearing. Audiometry using pure tone air and bone conduction testing revealed a mild sloping to moderate sensorineural hearing loss, bilaterally. Reliability was  good . Speech reception thresholds were in good agreement with the pure tone averages, bilaterally. Speech discrimination scores were excellent, bilaterally. Tympanometry revealed normal middle ear peak pressure and compliance, bilaterally. The results were reviewed with the patient. Recommendations for follow up will be made pending physician consult. KELLY Babcock.    Audiology Student     Olena Medina [address] [facility] [id number]   Electronically signed by Olena Medina on 11/4/2022 at 11:07 AM

## 2022-11-04 NOTE — PROGRESS NOTES
[facility] Otolaryngology  Dr. Jovany Stringer. Wagner Emery. Ms.Ed        Patient Name:  Fred Tompkins  :  1965     CHIEF C/O:    Chief Complaint   Patient presents with    Follow-up     6 month tinnitus       HISTORY OBTAINED FROM:  patient    HISTORY OF PRESENT ILLNESS:       Kin Sahu is a 62y.o. year old female, here today for follow up of yearly audio and tinnitus. Patient was last seen 6 months ago and states her hearing has remained stable with no new complaints. She is currently using her hearing aids every day with good results and masking of tinnitus however she does notice the tinnitus when she removes them. She states that she does take a sleeping pill at nighttime and is not having difficulty sleeping with the remaining tinnitus. She denies any new ear pain or pressure. She does complain of some TMJ discomfort that does make it difficult to wear her hearing aids at times. She currently wears dentures but is only able to wear her upper plate due to fitment issues. She does have an appointment scheduled with her dentist for adjustments. She otherwise has no new complaints at this time. Past Medical History:   Diagnosis Date    Arthritis     Asthma     inhaler prn    Depression     Fibromyalgia     GERD (gastroesophageal reflux disease)     Hyperlipidemia     Hypertension     Hx. No Tx for 1 yr.     Pernicious anemia     Vertigo      Past Surgical History:   Procedure Laterality Date    CHOLECYSTECTOMY      FRACTURE SURGERY      left arm/ plate    GALLBLADDER SURGERY      HYSTERECTOMY (CERVIX STATUS UNKNOWN)      OTHER SURGICAL HISTORY Right 13    chssman-snook procedure, right foot       Current Outpatient Medications:     acetaminophen (TYLENOL) 500 MG tablet, Take 1,000 mg by mouth every 6 hours as needed for Pain, Disp: , Rfl:     ARIPiprazole (ABILIFY) 5 MG tablet, , Disp: , Rfl:     EPINEPHrine (EPIPEN) 0.3 MG/0.3ML SOAJ injection, Inject 0.3 mLs into the muscle, Disp: , Rfl: vitamin B-6 (PYRIDOXINE) 50 MG tablet, TAKE ONE TABLET BY MOUTH ONCE PER DAY FOR 30 DAYS., Disp: , Rfl:     atorvastatin (LIPITOR) 10 MG tablet, Take 10 mg by mouth daily, Disp: , Rfl:     zolpidem (AMBIEN) 10 MG tablet, Take by mouth nightly as needed for Sleep., Disp: , Rfl:     omeprazole (PRILOSEC) 20 MG delayed release capsule, Take 20 mg by mouth daily, Disp: , Rfl:     FLUoxetine (PROZAC) 20 MG capsule, Take 60 mg by mouth daily, Disp: , Rfl:     melatonin 10 MG CAPS capsule, Take 10 mg by mouth nightly, Disp: , Rfl:     chlorzoxazone (PARAFON FORTE) 500 MG tablet, Take 500 mg by mouth 3 times daily, Disp: , Rfl:     lisinopril (PRINIVIL;ZESTRIL) 10 MG tablet, Take 10 mg by mouth daily, Disp: , Rfl:     Cyanocobalamin (B-12) 3000 MCG SUBL, Place under the tongue, Disp: , Rfl:     divalproex (DEPAKOTE ER) 250 MG extended release tablet, Take 250 mg by mouth daily , Disp: , Rfl:     Cholecalciferol (VITAMIN D3) 48378 units CAPS, Take 50,000 Units by mouth once a week, Disp: , Rfl:     gabapentin (NEURONTIN) 300 MG capsule, 600 mg 3 times daily. , Disp: , Rfl:     traMADol (ULTRAM) 50 MG tablet, Take 100 mg by mouth 3 times daily. , Disp: , Rfl:     albuterol (PROVENTIL HFA;VENTOLIN HFA) 108 (90 BASE) MCG/ACT inhaler, Inhale 2 puffs into the lungs every 6 hours as needed. , Disp: , Rfl:   Bee venom, Penicillins, Medrol [methylprednisolone], Ciprofloxacin, Hydrochlorothiazide, and Sulfa antibiotics  Social History     Tobacco Use    Smoking status: Never    Smokeless tobacco: Never   Substance Use Topics    Alcohol use: No    Drug use: No     History reviewed. No pertinent family history. Review of Systems   Constitutional: Negative. Negative for activity change and appetite change. HENT:  Positive for hearing loss and tinnitus. Negative for ear discharge and ear pain. Eyes: Negative. Respiratory: Negative. Negative for shortness of breath and stridor. Cardiovascular: Negative.   Negative for chest pain and palpitations. Endocrine: Negative. Musculoskeletal: Negative. Skin: Negative. Neurological: Negative. Negative for dizziness. Hematological: Negative. Psychiatric/Behavioral: Negative. /76   Pulse 66   Ht 5' 4\" (1.626 m)   Wt 276 lb (125.2 kg)   BMI 47.38 kg/m²   Physical Exam  Constitutional:       Appearance: Normal appearance. HENT:      Head: Normocephalic. Jaw: Tenderness present. Right Ear: Tympanic membrane, ear canal and external ear normal. Decreased hearing noted. Left Ear: Tympanic membrane, ear canal and external ear normal. Decreased hearing noted. Nose: Nose normal. No rhinorrhea. Right Turbinates: Not pale. Left Turbinates: Not pale. Mouth/Throat:      Lips: Pink. Mouth: Mucous membranes are moist.      Dentition: Has dentures. Pharynx: Oropharynx is clear. Eyes:      Conjunctiva/sclera: Conjunctivae normal.      Pupils: Pupils are equal, round, and reactive to light. Cardiovascular:      Rate and Rhythm: Normal rate and regular rhythm. Pulses: Normal pulses. Pulmonary:      Effort: Pulmonary effort is normal. No respiratory distress. Breath sounds: No stridor. Musculoskeletal:         General: Normal range of motion. Cervical back: Normal range of motion. No rigidity. No muscular tenderness. Skin:     General: Skin is warm and dry. Neurological:      General: No focal deficit present. Mental Status: She is alert and oriented to person, place, and time. Psychiatric:         Mood and Affect: Mood normal.         Behavior: Behavior normal.         Thought Content: Thought content normal.         Judgment: Judgment normal.           Audiogram and tympanogram reviewed with patient. Audiogram reveals 30 dB hearing loss in the right ear with 90% discrimination at 70 dB, 40 dB of hearing loss in the left ear with 100% discrimination at 80 dB.   Audiogram is symmetrical. Tympanogram reveals type A curve in the right ear, with type A curve in the left ear. IMPRESSION/PLAN:      Srikanth was seen today for follow-up. Diagnoses and all orders for this visit:    Sensorineural hearing loss (SNHL) of both ears    Tinnitus of both ears      Patient is seen and examined today for yearly audio evaluation and tinnitus. Audiogram is reviewed with the patient and stable from most recent study of 2021. She is encouraged to begin using warm compresses and NSAID medications for left TMJ tenderness. She will otherwise follow-u with her dentist to discuss fitment issues with her dentures. She will follow-up for audio in 1 year. She is instructed to call with any new or worsening symptoms prior to her next appointment.       Maryam Osuna, MSN, FNP-C  8 [facility], Nose and Throat    The information contained in this note has been dictated using drug and medical speech recognition software and may contain errors

## 2023-05-26 ENCOUNTER — OFFICE VISIT (OUTPATIENT)
Dept: ENT CLINIC | Age: 58
End: 2023-05-26
Payer: MEDICARE

## 2023-05-26 VITALS
DIASTOLIC BLOOD PRESSURE: 91 MMHG | HEART RATE: 59 BPM | WEIGHT: 276 LBS | BODY MASS INDEX: 47.12 KG/M2 | HEIGHT: 64 IN | SYSTOLIC BLOOD PRESSURE: 161 MMHG

## 2023-05-26 DIAGNOSIS — H92.03 OTALGIA, BILATERAL: ICD-10-CM

## 2023-05-26 DIAGNOSIS — M26.623 BILATERAL TEMPOROMANDIBULAR JOINT PAIN: Primary | ICD-10-CM

## 2023-05-26 PROCEDURE — 99213 OFFICE O/P EST LOW 20 MIN: CPT | Performed by: NURSE PRACTITIONER

## 2023-05-26 PROCEDURE — G8427 DOCREV CUR MEDS BY ELIG CLIN: HCPCS | Performed by: NURSE PRACTITIONER

## 2023-05-26 PROCEDURE — 3017F COLORECTAL CA SCREEN DOC REV: CPT | Performed by: NURSE PRACTITIONER

## 2023-05-26 PROCEDURE — 1036F TOBACCO NON-USER: CPT | Performed by: NURSE PRACTITIONER

## 2023-05-26 PROCEDURE — G8417 CALC BMI ABV UP PARAM F/U: HCPCS | Performed by: NURSE PRACTITIONER

## 2023-05-26 ASSESSMENT — ENCOUNTER SYMPTOMS
EYES NEGATIVE: 1
RESPIRATORY NEGATIVE: 1
STRIDOR: 0
SHORTNESS OF BREATH: 0

## 2023-05-26 NOTE — PROGRESS NOTES
[facility] Otolaryngology  Dr. Asif Zhao. Ms.Ed        Patient Name:  Sandy Roberts  :  1965     CHIEF C/O:    Chief Complaint   Patient presents with    Follow-up     1 mo B/L ear pain       HISTORY OBTAINED FROM:  patient    HISTORY OF PRESENT ILLNESS:       uCate Musa is a 62y.o. year old female, here today for follow up of:       Bilateral ear pain. Patient was last seen 6 weeks ago and started on diclofenac with warm compresses for bilateral TMJ pain. She also states that there was a medication change from her pain clinic and she did notice some decrease in symptoms. She states that her symptoms have not returned significant bilateral TMJ and ear pain that continues. Patient states she has found a dentist that accepts her insurance but has not yet made an appointment. She denies any drainage from either ear. She continues to have intermittent sensation of muffled hearing and ringing in the ears. She denies any dizziness. Past Medical History:   Diagnosis Date    Arthritis     Asthma     inhaler prn    Depression     Fibromyalgia     GERD (gastroesophageal reflux disease)     Hyperlipidemia     Hypertension     Hx. No Tx for 1 yr.     Pernicious anemia     Vertigo      Past Surgical History:   Procedure Laterality Date    CHOLECYSTECTOMY      FRACTURE SURGERY      left arm/ plate    GALLBLADDER SURGERY      HYSTERECTOMY (CERVIX STATUS UNKNOWN)      OTHER SURGICAL HISTORY Right 13    chssman-snook procedure, right foot       Current Outpatient Medications:     diclofenac (VOLTAREN) 50 MG EC tablet, Take 1 tablet by mouth 2 times daily (with meals), Disp: 60 tablet, Rfl: 2    HYDROcodone-acetaminophen (NORCO) 7.5-325 MG per tablet, Take 1 tablet by mouth every 6 hours as needed for Pain., Disp: , Rfl:     ARIPiprazole (ABILIFY) 5 MG tablet, , Disp: , Rfl:     EPINEPHrine (EPIPEN) 0.3 MG/0.3ML SOAJ injection, Inject 0.3 mLs into the muscle, Disp: , Rfl:     vitamin B-6

## 2023-06-26 ENCOUNTER — HOSPITAL ENCOUNTER (EMERGENCY)
Age: 58
Discharge: HOME | End: 2023-06-26
Attending: FAMILY MEDICINE
Payer: MEDICARE

## 2023-06-26 ENCOUNTER — APPOINTMENT (OUTPATIENT)
Dept: GENERAL RADIOLOGY | Age: 58
End: 2023-06-26
Payer: MEDICARE

## 2023-06-26 VITALS
BODY MASS INDEX: 46.69 KG/M2 | DIASTOLIC BLOOD PRESSURE: 77 MMHG | RESPIRATION RATE: 16 BRPM | TEMPERATURE: 98.4 F | SYSTOLIC BLOOD PRESSURE: 156 MMHG | OXYGEN SATURATION: 97 % | WEIGHT: 272 LBS | HEART RATE: 76 BPM

## 2023-06-26 DIAGNOSIS — S50.01XA CONTUSION OF RIGHT ELBOW, INITIAL ENCOUNTER: Primary | ICD-10-CM

## 2023-06-26 PROCEDURE — 73070 X-RAY EXAM OF ELBOW: CPT

## 2023-06-26 PROCEDURE — 99283 EMERGENCY DEPT VISIT LOW MDM: CPT

## 2023-06-26 RX ORDER — IBUPROFEN 800 MG/1
800 TABLET ORAL EVERY 8 HOURS PRN
Qty: 20 TABLET | Refills: 0 | Status: SHIPPED | OUTPATIENT
Start: 2023-06-26

## 2023-06-26 ASSESSMENT — PAIN DESCRIPTION - LOCATION: LOCATION: ELBOW

## 2023-06-26 ASSESSMENT — PAIN DESCRIPTION - ONSET: ONSET: SUDDEN

## 2023-06-26 ASSESSMENT — PAIN SCALES - GENERAL
PAINLEVEL_OUTOF10: 5
PAINLEVEL_OUTOF10: 5

## 2023-06-26 ASSESSMENT — PAIN - FUNCTIONAL ASSESSMENT
PAIN_FUNCTIONAL_ASSESSMENT: 0-10
PAIN_FUNCTIONAL_ASSESSMENT: 0-10

## 2023-06-26 ASSESSMENT — PAIN DESCRIPTION - ORIENTATION: ORIENTATION: RIGHT

## 2023-06-26 ASSESSMENT — PAIN DESCRIPTION - FREQUENCY: FREQUENCY: CONTINUOUS

## 2023-06-26 ASSESSMENT — PAIN DESCRIPTION - PAIN TYPE: TYPE: ACUTE PAIN

## 2023-06-26 ASSESSMENT — PAIN DESCRIPTION - DESCRIPTORS: DESCRIPTORS: THROBBING;SHOOTING

## 2023-06-28 ENCOUNTER — HOSPITAL ENCOUNTER (OUTPATIENT)
Dept: CT IMAGING | Age: 58
Discharge: HOME | End: 2023-06-28
Payer: MEDICARE

## 2023-06-28 DIAGNOSIS — H92.03 OTALGIA, BILATERAL: ICD-10-CM

## 2023-06-28 DIAGNOSIS — M26.623 BILATERAL TEMPOROMANDIBULAR JOINT PAIN: ICD-10-CM

## 2023-06-28 PROCEDURE — 70480 CT ORBIT/EAR/FOSSA W/O DYE: CPT

## 2023-07-14 ENCOUNTER — OFFICE VISIT (OUTPATIENT)
Dept: ENT CLINIC | Age: 58
End: 2023-07-14
Payer: MEDICARE

## 2023-07-14 ENCOUNTER — PROCEDURE VISIT (OUTPATIENT)
Dept: AUDIOLOGY | Age: 58
End: 2023-07-14

## 2023-07-14 VITALS — HEIGHT: 64 IN | BODY MASS INDEX: 46.44 KG/M2 | WEIGHT: 272 LBS

## 2023-07-14 DIAGNOSIS — H93.19 TINNITUS, UNSPECIFIED LATERALITY: ICD-10-CM

## 2023-07-14 DIAGNOSIS — M26.623 BILATERAL TEMPOROMANDIBULAR JOINT PAIN: Primary | ICD-10-CM

## 2023-07-14 DIAGNOSIS — H92.03 OTALGIA OF BOTH EARS: Primary | ICD-10-CM

## 2023-07-14 DIAGNOSIS — H92.03 OTALGIA, BILATERAL: ICD-10-CM

## 2023-07-14 PROCEDURE — G8427 DOCREV CUR MEDS BY ELIG CLIN: HCPCS | Performed by: NURSE PRACTITIONER

## 2023-07-14 PROCEDURE — 3017F COLORECTAL CA SCREEN DOC REV: CPT | Performed by: NURSE PRACTITIONER

## 2023-07-14 PROCEDURE — 1036F TOBACCO NON-USER: CPT | Performed by: NURSE PRACTITIONER

## 2023-07-14 PROCEDURE — 99213 OFFICE O/P EST LOW 20 MIN: CPT | Performed by: NURSE PRACTITIONER

## 2023-07-14 PROCEDURE — G8417 CALC BMI ABV UP PARAM F/U: HCPCS | Performed by: NURSE PRACTITIONER

## 2023-07-14 ASSESSMENT — ENCOUNTER SYMPTOMS
STRIDOR: 0
RESPIRATORY NEGATIVE: 1
SHORTNESS OF BREATH: 0
EYES NEGATIVE: 1

## 2023-07-14 NOTE — PROGRESS NOTES
This patient was referred for tympanometric testing by JONATHAN Garcia due to ear pain. Tympanometry revealed normal middle ear peak pressure and compliance, in the right ear, and a shallow tympanogram in the left ear. The results were reviewed with the patient. Recommendations for follow up will be made pending physician consult.     Electronically signed by Olena Kruse on 7/14/2023 at 12:27 PM

## 2023-07-14 NOTE — PROGRESS NOTES
[facility] Otolaryngology  Dr. Ely Boyd. Ms.Ed        Patient Name:  Huey Treviño  :  1965     CHIEF C/O:    Chief Complaint   Patient presents with    Follow-up     Patient states that today her ears are ok, that they have been better but that she is still getting pressure and pain with the feeling of something crawling inside. CT results. HISTORY OBTAINED FROM:  patient    HISTORY OF PRESENT ILLNESS:       Tena Garrison is a 62y.o. year old female, here today for follow up of:       Ear pain, CT results. Last seen 6 weeks ago  CT IAC no abnormalities of ear or TMJ  Started on diclofenac with good results  Has seen reduction in pain   Continues to have ringing in the right   Hearing feels normal  Continues to have intermittent ear and TMJ pain with crawling sensation in ears  Due to have Dentures checked/refit. Past Medical History:   Diagnosis Date    Arthritis     Asthma     inhaler prn    Depression     Fibromyalgia     GERD (gastroesophageal reflux disease)     Hyperlipidemia     Hypertension     Hx. No Tx for 1 yr.     Pernicious anemia     Vertigo      Past Surgical History:   Procedure Laterality Date    CHOLECYSTECTOMY      FRACTURE SURGERY      left arm/ plate    GALLBLADDER SURGERY      HYSTERECTOMY (CERVIX STATUS UNKNOWN)      OTHER SURGICAL HISTORY Right 13    chssman-snook procedure, right foot       Current Outpatient Medications:     diclofenac (VOLTAREN) 50 MG EC tablet, TAKE ONE TABLET BY MOUTH TWICE A DAY WITH MEALS, Disp: 60 tablet, Rfl: 0    ibuprofen (ADVIL;MOTRIN) 800 MG tablet, Take 1 tablet by mouth every 8 hours as needed for Pain, Disp: 20 tablet, Rfl: 0    HYDROcodone-acetaminophen (NORCO) 7.5-325 MG per tablet, Take 1 tablet by mouth every 6 hours as needed for Pain., Disp: , Rfl:     ARIPiprazole (ABILIFY) 5 MG tablet, , Disp: , Rfl:     EPINEPHrine (EPIPEN) 0.3 MG/0.3ML SOAJ injection, Inject 0.3 mLs into the muscle, Disp: , Rfl:     vitamin

## 2023-07-22 ENCOUNTER — HOSPITAL ENCOUNTER (OUTPATIENT)
Age: 58
Discharge: HOME | End: 2023-07-22
Payer: MEDICARE

## 2023-07-22 LAB
25(OH)D3 SERPL-MCNC: 45 NG/ML (ref 30–100)
ALBUMIN SERPL-MCNC: 3.9 G/DL (ref 3.5–5.2)
ALP SERPL-CCNC: 80 U/L (ref 35–104)
ALT SERPL-CCNC: 14 U/L (ref 0–32)
ANION GAP SERPL CALCULATED.3IONS-SCNC: 13 MMOL/L (ref 7–16)
AST SERPL-CCNC: 18 U/L (ref 0–31)
BILIRUB SERPL-MCNC: 0.4 MG/DL (ref 0–1.2)
BILIRUB UR QL STRIP: NEGATIVE
BUN SERPL-MCNC: 9 MG/DL (ref 6–20)
CALCIUM SERPL-MCNC: 9.2 MG/DL (ref 8.6–10.2)
CHLORIDE SERPL-SCNC: 103 MMOL/L (ref 98–107)
CHOLEST SERPL-MCNC: 173 MG/DL
CLARITY UR: CLEAR
CO2 SERPL-SCNC: 24 MMOL/L (ref 22–29)
COLOR UR: YELLOW
CREAT SERPL-MCNC: 0.8 MG/DL (ref 0.5–1)
ERYTHROCYTE [DISTWIDTH] IN BLOOD BY AUTOMATED COUNT: 16.1 % (ref 12–16)
GFR SERPL CREATININE-BSD FRML MDRD: >60 ML/MIN/1.73M2
GLUCOSE P FAST SERPL-MCNC: 119 MG/DL (ref 74–99)
GLUCOSE UR STRIP-MCNC: NEGATIVE MG/DL
HCT VFR BLD AUTO: 38.1 % (ref 34–48)
HDLC SERPL-MCNC: 37 MG/DL
HGB BLD-MCNC: 11.5 G/DL (ref 11.5–15.5)
HGB UR QL STRIP.AUTO: ABNORMAL
KETONES UR STRIP-MCNC: NEGATIVE MG/DL
LDLC SERPL CALC-MCNC: 57 MG/DL
LEUKOCYTE ESTERASE UR QL STRIP: NEGATIVE
MCH RBC QN AUTO: 23.4 PG (ref 26–35)
MCHC RBC AUTO-ENTMCNC: 30.2 G/DL (ref 32–34.5)
MCV RBC AUTO: 77.6 FL (ref 80–99.9)
NITRITE UR QL STRIP: NEGATIVE
PH UR STRIP: 5.5 [PH] (ref 5–9)
PLATELET # BLD AUTO: 256 K/UL (ref 130–450)
PMV BLD AUTO: 10.7 FL (ref 7–12)
POTASSIUM SERPL-SCNC: 3.7 MMOL/L (ref 3.5–5)
PROT SERPL-MCNC: 7.2 G/DL (ref 6.4–8.3)
PROT UR STRIP-MCNC: NEGATIVE MG/DL
RBC # BLD AUTO: 4.91 M/UL (ref 3.5–5.5)
SODIUM SERPL-SCNC: 140 MMOL/L (ref 132–146)
SP GR UR STRIP: 1.01 (ref 1–1.03)
TRIGL SERPL-MCNC: 394 MG/DL
TSH SERPL DL<=0.05 MIU/L-ACNC: 1.21 UIU/ML (ref 0.27–4.2)
UROBILINOGEN UR STRIP-ACNC: 0.2 EU/DL (ref 0–1)
VLDLC SERPL CALC-MCNC: 79 MG/DL
WBC OTHER # BLD: 6.8 K/UL (ref 4.5–11.5)

## 2023-07-22 PROCEDURE — 80053 COMPREHEN METABOLIC PANEL: CPT

## 2023-07-22 PROCEDURE — 84443 ASSAY THYROID STIM HORMONE: CPT

## 2023-07-22 PROCEDURE — 36415 COLL VENOUS BLD VENIPUNCTURE: CPT

## 2023-07-22 PROCEDURE — 82306 VITAMIN D 25 HYDROXY: CPT

## 2023-07-22 PROCEDURE — 80061 LIPID PANEL: CPT

## 2023-07-22 PROCEDURE — 85027 COMPLETE CBC AUTOMATED: CPT

## 2023-07-22 PROCEDURE — 81003 URINALYSIS AUTO W/O SCOPE: CPT

## 2023-12-15 ENCOUNTER — OFFICE VISIT (OUTPATIENT)
Dept: ENT CLINIC | Age: 58
End: 2023-12-15
Payer: MEDICARE

## 2023-12-15 ENCOUNTER — PROCEDURE VISIT (OUTPATIENT)
Dept: AUDIOLOGY | Age: 58
End: 2023-12-15
Payer: MEDICARE

## 2023-12-15 VITALS — HEIGHT: 64 IN | BODY MASS INDEX: 42.85 KG/M2 | WEIGHT: 251 LBS

## 2023-12-15 DIAGNOSIS — R94.120 NEGATIVE PRESSURE OF LEFT MIDDLE EAR WITH TYPE C TYMPANOGRAM CURVE: ICD-10-CM

## 2023-12-15 DIAGNOSIS — H93.13 TINNITUS OF BOTH EARS: ICD-10-CM

## 2023-12-15 DIAGNOSIS — H90.3 SENSORINEURAL HEARING LOSS (SNHL) OF BOTH EARS: Primary | ICD-10-CM

## 2023-12-15 DIAGNOSIS — M26.623 BILATERAL TEMPOROMANDIBULAR JOINT PAIN: ICD-10-CM

## 2023-12-15 DIAGNOSIS — H92.03 OTALGIA, BILATERAL: ICD-10-CM

## 2023-12-15 DIAGNOSIS — J30.9 ALLERGIC RHINITIS, UNSPECIFIED SEASONALITY, UNSPECIFIED TRIGGER: ICD-10-CM

## 2023-12-15 DIAGNOSIS — H92.03 OTALGIA OF BOTH EARS: ICD-10-CM

## 2023-12-15 PROCEDURE — 1036F TOBACCO NON-USER: CPT | Performed by: NURSE PRACTITIONER

## 2023-12-15 PROCEDURE — G8484 FLU IMMUNIZE NO ADMIN: HCPCS | Performed by: NURSE PRACTITIONER

## 2023-12-15 PROCEDURE — 92567 TYMPANOMETRY: CPT

## 2023-12-15 PROCEDURE — 92557 COMPREHENSIVE HEARING TEST: CPT

## 2023-12-15 PROCEDURE — 3017F COLORECTAL CA SCREEN DOC REV: CPT | Performed by: NURSE PRACTITIONER

## 2023-12-15 PROCEDURE — G8417 CALC BMI ABV UP PARAM F/U: HCPCS | Performed by: NURSE PRACTITIONER

## 2023-12-15 PROCEDURE — 99213 OFFICE O/P EST LOW 20 MIN: CPT | Performed by: NURSE PRACTITIONER

## 2023-12-15 PROCEDURE — G8427 DOCREV CUR MEDS BY ELIG CLIN: HCPCS | Performed by: NURSE PRACTITIONER

## 2023-12-15 RX ORDER — FLUTICASONE PROPIONATE 50 MCG
2 SPRAY, SUSPENSION (ML) NASAL DAILY
Qty: 16 G | Refills: 3 | Status: SHIPPED | OUTPATIENT
Start: 2023-12-15

## 2023-12-15 RX ORDER — ECHINACEA PURPUREA EXTRACT 125 MG
2 TABLET ORAL 4 TIMES DAILY
Qty: 3 EACH | Refills: 3 | Status: SHIPPED | OUTPATIENT
Start: 2023-12-15

## 2023-12-15 NOTE — PROGRESS NOTES
This patient was referred for audiometric and tympanometric testing by JONATHAN Gonzales due to hearing loss. Patient has bilateral tinnitus and reported still having intermittent ear pain. Audiometry using pure tone air and bone conduction testing revealed a mild to moderate sensorineural hearing loss, bilaterally. Reliability was good. Speech reception thresholds were in good agreement with the pure tone averages, bilaterally. Speech discrimination scores were excellent, bilaterally. Tympanometry revealed normal middle ear peak pressure and compliance, in the right ear and negative middle ear pressure (-143 daPa), in the left ear. The results were reviewed with the patient. Recommendations for follow up will be made pending physician consult.     Electronically signed by Olena Mcnamara on 12/15/2023 at 8:45 AM

## 2024-03-15 ENCOUNTER — OFFICE VISIT (OUTPATIENT)
Dept: ENT CLINIC | Age: 59
End: 2024-03-15
Payer: MEDICARE

## 2024-03-15 ENCOUNTER — PROCEDURE VISIT (OUTPATIENT)
Dept: AUDIOLOGY | Age: 59
End: 2024-03-15

## 2024-03-15 VITALS
SYSTOLIC BLOOD PRESSURE: 122 MMHG | HEART RATE: 79 BPM | BODY MASS INDEX: 42.85 KG/M2 | HEIGHT: 64 IN | WEIGHT: 251 LBS | DIASTOLIC BLOOD PRESSURE: 84 MMHG

## 2024-03-15 DIAGNOSIS — J30.9 ALLERGIC RHINITIS, UNSPECIFIED SEASONALITY, UNSPECIFIED TRIGGER: Primary | ICD-10-CM

## 2024-03-15 DIAGNOSIS — H92.03 OTALGIA, BILATERAL: ICD-10-CM

## 2024-03-15 DIAGNOSIS — H69.93 DYSFUNCTION OF BOTH EUSTACHIAN TUBES: Primary | ICD-10-CM

## 2024-03-15 DIAGNOSIS — M26.623 BILATERAL TEMPOROMANDIBULAR JOINT PAIN: ICD-10-CM

## 2024-03-15 PROCEDURE — 99213 OFFICE O/P EST LOW 20 MIN: CPT | Performed by: NURSE PRACTITIONER

## 2024-03-15 PROCEDURE — G8417 CALC BMI ABV UP PARAM F/U: HCPCS | Performed by: NURSE PRACTITIONER

## 2024-03-15 PROCEDURE — G8484 FLU IMMUNIZE NO ADMIN: HCPCS | Performed by: NURSE PRACTITIONER

## 2024-03-15 PROCEDURE — 3017F COLORECTAL CA SCREEN DOC REV: CPT | Performed by: NURSE PRACTITIONER

## 2024-03-15 PROCEDURE — 1036F TOBACCO NON-USER: CPT | Performed by: NURSE PRACTITIONER

## 2024-03-15 PROCEDURE — G8427 DOCREV CUR MEDS BY ELIG CLIN: HCPCS | Performed by: NURSE PRACTITIONER

## 2024-03-15 RX ORDER — GEMFIBROZIL 600 MG/1
TABLET, FILM COATED ORAL
COMMUNITY
Start: 2024-03-14

## 2024-03-15 RX ORDER — AZELASTINE 1 MG/ML
1-2 SPRAY, METERED NASAL 2 TIMES DAILY PRN
Qty: 30 ML | Refills: 1 | Status: SHIPPED | OUTPATIENT
Start: 2024-03-15

## 2024-03-15 ASSESSMENT — ENCOUNTER SYMPTOMS
EYES NEGATIVE: 1
RHINORRHEA: 1
SHORTNESS OF BREATH: 0
SINUS PRESSURE: 0
STRIDOR: 0
SINUS PAIN: 0
COUGH: 1

## 2024-03-15 NOTE — PROGRESS NOTES
-- DO NOT REPLY / DO NOT REPLY ALL --  -- Message is from Engagement Center Operations (EC[employer]) --    ONLY TO BE USED WITHIN A REFILL MEDICATION ENCOUNTER    Med Refill  Is the patient currently having any symptoms?: No/Non-Emergent symptoms    Name of medication requested: See pended med    Has patient contacted the pharmacy? Yes    Is this the first request for the medication in the last 48 hours?: Yes    Patient is requesting a medication refill - medication is on active medication list    Patient is currently OUT of the requested medication - sent as HIGH priority      Full name of the provider who ordered the medication: Paul Celestin    [facility] site name / Account # for provider: [facility]    Preferred Pharmacy: Pharmacy  [facility] #[id number] - [place], [place] - [address] [facility] [address]    Patient confirmed the above pharmacy as correct?  Yes      Caller Information       Type Contact Phone/Fax    07/27/2023 07:18 AM CDT Phone (Incoming) Piter Ruiz HANSEL (Self) [phone] (M)          Alternative phone number: no    Can a detailed message be left?: Yes    Patient is completely out of medication: Verify if patient is currently experiencing symptoms. If patient is symptomatic, proceed with front end triage instead of medication refill. If patient is not symptomatic but is completely out of medication, mis as High priority when routing. Inform patient: “Please call back with any questions or concerns and if your condition becomes life threatening, you should seek immediate medical assistance by calling 911 or going to the Emergency Department for evaluation.”    Inform all patients: \"If the clinical team needs to contact you regarding this refill, please be aware the return phone call may come from an unidentified or out of state phone number and your refill request will be addressed as soon as the clinical team reviews your message.\"   Breath sounds: No stridor.   Musculoskeletal:         General: Normal range of motion.      Cervical back: Normal range of motion. No rigidity. No muscular tenderness.   Skin:     General: Skin is warm and dry.   Neurological:      General: No focal deficit present.      Mental Status: She is alert and oriented to person, place, and time.   Psychiatric:         Mood and Affect: Mood normal.         Behavior: Behavior normal.         Thought Content: Thought content normal.         Judgment: Judgment normal.       Tympanogram reviewed with patient.  Reveals type A curve in the right ear, with type A curve in the left ear.  IMPRESSION/PLAN:    Gladis was seen today for follow-up.    Diagnoses and all orders for this visit:    Allergic rhinitis, unspecified seasonality, unspecified trigger    Otalgia, bilateral    Bilateral temporomandibular joint pain    Other orders  -     azelastine (ASTELIN) 0.1 % nasal spray; 1-2 sprays by Nasal route 2 times daily as needed for Rhinitis Use in each nostril as directed      At this time patient will continue with her Flonase 2 sprays each nostril once daily with nasal saline 2 sprays each nostril 4 times daily.  She will also start Astelin 1 to 2 sprays each nostril twice daily as needed for increased congestion, rhinorrhea, postnasal drainage.  She is encouraged to continue with warm compresses and NSAID medication for any TMJ pain and to follow-up with the oral surgeon as scheduled.  She will follow-up in 3 months.  She will call for any new or worsening symptoms prior to her next appointment.      Kyle Vora, MSN, FNP-C  [facility] - Ear, Nose and Throat    The information contained in this note has been dictated using drug and medical speech recognition software and may contain errors

## 2024-03-15 NOTE — PROGRESS NOTES
This patient was referred for tympanometric testing by JONATHAN Cheney due to eustachian tube dysfunction.     Tympanometry revealed shallow tympanograms, bilaterally.    The results were reviewed with the patient.     Recommendations for follow up will be made pending physician consult.    Electronically signed by Olena Jefferson on 3/15/2024 at 9:29 AM

## 2024-06-14 ENCOUNTER — PROCEDURE VISIT (OUTPATIENT)
Dept: AUDIOLOGY | Age: 59
End: 2024-06-14
Payer: MEDICARE

## 2024-06-14 ENCOUNTER — OFFICE VISIT (OUTPATIENT)
Dept: ENT CLINIC | Age: 59
End: 2024-06-14
Payer: MEDICARE

## 2024-06-14 VITALS
SYSTOLIC BLOOD PRESSURE: 131 MMHG | WEIGHT: 270 LBS | HEIGHT: 64 IN | DIASTOLIC BLOOD PRESSURE: 85 MMHG | BODY MASS INDEX: 46.1 KG/M2 | HEART RATE: 68 BPM

## 2024-06-14 DIAGNOSIS — M26.623 BILATERAL TEMPOROMANDIBULAR JOINT PAIN: ICD-10-CM

## 2024-06-14 DIAGNOSIS — R13.10 DYSPHAGIA, UNSPECIFIED TYPE: Primary | ICD-10-CM

## 2024-06-14 DIAGNOSIS — H92.03 OTALGIA, BILATERAL: ICD-10-CM

## 2024-06-14 DIAGNOSIS — H93.8X3 PRESSURE SENSATION IN BOTH EARS: Primary | ICD-10-CM

## 2024-06-14 DIAGNOSIS — J30.9 ALLERGIC RHINITIS, UNSPECIFIED SEASONALITY, UNSPECIFIED TRIGGER: ICD-10-CM

## 2024-06-14 PROCEDURE — 92567 TYMPANOMETRY: CPT | Performed by: AUDIOLOGIST

## 2024-06-14 PROCEDURE — 3017F COLORECTAL CA SCREEN DOC REV: CPT | Performed by: NURSE PRACTITIONER

## 2024-06-14 PROCEDURE — 99214 OFFICE O/P EST MOD 30 MIN: CPT | Performed by: NURSE PRACTITIONER

## 2024-06-14 PROCEDURE — G8427 DOCREV CUR MEDS BY ELIG CLIN: HCPCS | Performed by: NURSE PRACTITIONER

## 2024-06-14 PROCEDURE — G8417 CALC BMI ABV UP PARAM F/U: HCPCS | Performed by: NURSE PRACTITIONER

## 2024-06-14 PROCEDURE — 1036F TOBACCO NON-USER: CPT | Performed by: NURSE PRACTITIONER

## 2024-06-14 ASSESSMENT — ENCOUNTER SYMPTOMS
STRIDOR: 0
COUGH: 0
RHINORRHEA: 1
SINUS PAIN: 0
TROUBLE SWALLOWING: 1
SINUS PRESSURE: 0
SHORTNESS OF BREATH: 0
EYES NEGATIVE: 1

## 2024-06-14 NOTE — PROGRESS NOTES
This patient was referred for tympanometric testing by JONATHAN Cheney due to ear pressure, bilaterally.     Tympanometry revealed normal middle ear peak pressure and reduced compliance, bilaterally.  Ipsilateral acoustic reflexes were present, bilaterally at 1000Hz.    The results were reviewed with the patient.     Recommendations for follow up will be made pending physician consult.    Williams Hopper CCC/GEETA  Audiologist  [id number]  NPI#:  [id number]      Electronically signed by Olena Benedict on 6/14/2024 at 9:04 AM

## 2024-06-14 NOTE — PROGRESS NOTES
daily, Disp: , Rfl:     lisinopril (PRINIVIL;ZESTRIL) 10 MG tablet, Take 1 tablet by mouth daily, Disp: , Rfl:     Cyanocobalamin (B-12) 3000 MCG SUBL, Place under the tongue, Disp: , Rfl:     divalproex (DEPAKOTE ER) 250 MG extended release tablet, Take 1 tablet by mouth daily, Disp: , Rfl:     Cholecalciferol (VITAMIN D3) 94865 units CAPS, Take 1 capsule by mouth once a week, Disp: , Rfl:     gabapentin (NEURONTIN) 300 MG capsule, 2 capsules 3 times daily., Disp: , Rfl:     albuterol (PROVENTIL HFA;VENTOLIN HFA) 108 (90 BASE) MCG/ACT inhaler, Inhale 2 puffs into the lungs every 6 hours as needed, Disp: , Rfl:   Bee venom, Penicillins, Medrol [methylprednisolone], Ciprofloxacin, Hydrochlorothiazide, and Sulfa antibiotics  Social History     Tobacco Use    Smoking status: Never    Smokeless tobacco: Never   Substance Use Topics    Alcohol use: No    Drug use: No     History reviewed. No pertinent family history.    Review of Systems   Constitutional: Negative.  Negative for activity change and appetite change.   HENT:  Positive for congestion, hearing loss, postnasal drip, rhinorrhea, tinnitus and trouble swallowing. Negative for ear discharge, ear pain, sinus pressure and sinus pain.    Eyes: Negative.    Respiratory:  Negative for cough, shortness of breath and stridor.    Cardiovascular: Negative.  Negative for chest pain and palpitations.   Endocrine: Negative.    Musculoskeletal: Negative.    Skin: Negative.    Neurological: Negative.  Negative for dizziness.   Hematological: Negative.    Psychiatric/Behavioral: Negative.         /85 (Site: Left Upper Arm, Position: Sitting, Cuff Size: Large Adult)   Pulse 68   Ht 1.626 m (5' 4.02\")   Wt 122.5 kg (270 lb)   BMI 46.32 kg/m²   Physical Exam  Constitutional:       Appearance: Normal appearance.   HENT:      Head: Normocephalic.      Jaw: Malocclusion present. No tenderness.      Right Ear: Tympanic membrane, ear canal and external ear normal. Decreased

## 2024-07-02 ENCOUNTER — HOSPITAL ENCOUNTER (OUTPATIENT)
Dept: GENERAL RADIOLOGY | Age: 59
Discharge: HOME | End: 2024-07-04
Payer: MEDICARE

## 2024-07-02 DIAGNOSIS — R13.10 DYSPHAGIA, UNSPECIFIED TYPE: ICD-10-CM

## 2024-07-02 PROCEDURE — 92526 ORAL FUNCTION THERAPY: CPT

## 2024-07-02 PROCEDURE — 92611 MOTION FLUOROSCOPY/SWALLOW: CPT

## 2024-07-02 PROCEDURE — 2500000003 HC RX 250 WO HCPCS: Performed by: PHYSICIAN ASSISTANT

## 2024-07-02 PROCEDURE — 74230 X-RAY XM SWLNG FUNCJ C+: CPT

## 2024-07-02 RX ADMIN — BARIUM SULFATE 70 G: 0.81 POWDER, FOR SUSPENSION ORAL at 14:43

## 2024-07-02 RX ADMIN — BARIUM SULFATE 10 ML: 400 PASTE ORAL at 14:42

## 2024-07-02 NOTE — PROGRESS NOTES
SPEECH/LANGUAGE PATHOLOGY  VIDEOFLUOROSCOPIC STUDY OF SWALLOWING (MBS)   and PLAN OF CARE    PATIENT NAME:  Gladis Ross  (female)     MRN:  [id number]    :  1965  (59 y.o.)  STATUS:  Outpatient    TODAY'S DATE:  2024  REFERRING PROVIDER:  yKle Bearden CNP   SPECIFIC PROVIDER ORDER: FL modified barium swallow with video  Date of order:  24   REASON FOR REFERRAL: Assess oropharyngeal swallow   EVALUATING THERAPIST: Rebekah Chavira      RESULTS:      DYSPHAGIA DIAGNOSIS:  minimal oropharyngeal phase dysphagia ; esophageal motility deficit suspected based off of patient's complaints    DIET RECOMMENDATIONS:  Regular consistency solids (IDDSI level 7) with  thin liquids (IDDSI level 0)    FEEDING RECOMMENDATIONS:    Assistance level:  No assistance needed     Compensatory strategies recommended: Thorough oral care to prevent colonization of oral bacteria      Discussed recommendations with:  report sent to referring provider    Laryngeal Penetration and Aspiration:  Neither penetration nor aspiration was observed in today's study     SPEECH THERAPY  PLAN OF CARE   The dysphagia POC is established based on physician order and dysphagia diagnosis    Dysphagia therapy is not recommended       Conditions Requiring Skilled Therapeutic Intervention for dysphagia:    Not applicable    SPECIFIC DYSPHAGIA INTERVENTIONS TO INCLUDE:     Not applicable no therapy warranted     Specific instructions for next treatment:  not applicable   Treatment Goals:    Short Term Goals:  Not applicable no therapy warranted     Long Term Goals:   Not applicable no therapy warranted      Patient/family Goal:    not applicable    Plan of care discussed with Patient   The Patient understand(s) the diagnosis, prognosis and plan of care     Rehabilitation Potential/Prognosis: good                      ADMITTING DIAGNOSIS: Dysphagia, unspecified type [R13.10]     VISIT DIAGNOSIS:   Visit Diagnoses         Codes

## 2024-07-12 ENCOUNTER — OFFICE VISIT (OUTPATIENT)
Dept: ENT CLINIC | Age: 59
End: 2024-07-12
Payer: MEDICARE

## 2024-07-12 VITALS
SYSTOLIC BLOOD PRESSURE: 144 MMHG | HEART RATE: 65 BPM | BODY MASS INDEX: 45.58 KG/M2 | HEIGHT: 64 IN | DIASTOLIC BLOOD PRESSURE: 83 MMHG | WEIGHT: 267 LBS

## 2024-07-12 DIAGNOSIS — R13.10 DYSPHAGIA, UNSPECIFIED TYPE: Primary | ICD-10-CM

## 2024-07-12 PROCEDURE — 1036F TOBACCO NON-USER: CPT | Performed by: NURSE PRACTITIONER

## 2024-07-12 PROCEDURE — 99213 OFFICE O/P EST LOW 20 MIN: CPT | Performed by: NURSE PRACTITIONER

## 2024-07-12 PROCEDURE — G8417 CALC BMI ABV UP PARAM F/U: HCPCS | Performed by: NURSE PRACTITIONER

## 2024-07-12 PROCEDURE — 3017F COLORECTAL CA SCREEN DOC REV: CPT | Performed by: NURSE PRACTITIONER

## 2024-07-12 PROCEDURE — G8427 DOCREV CUR MEDS BY ELIG CLIN: HCPCS | Performed by: NURSE PRACTITIONER

## 2024-07-12 ASSESSMENT — ENCOUNTER SYMPTOMS
EYES NEGATIVE: 1
STRIDOR: 0
SHORTNESS OF BREATH: 0
TROUBLE SWALLOWING: 1
COUGH: 0

## 2024-07-12 NOTE — PROGRESS NOTES
[facility]  ANNIE Del RealO. Ms.Ed        Patient Name:  Gladis Ross  :  1965     CHIEF C/O:    Chief Complaint   Patient presents with    Follow-up     Patient presents to review resutls of swallow study       HISTORY OBTAINED FROM:  patient, spouse    HISTORY OF PRESENT ILLNESS:       Gladis is a 59 y.o. year old female, here today for follow up of:       Dysphagia and swallow study results.  Patient was last seen 1 month ago and underwent a swallow study which returned back normal with no swallowing dysfunction.  She continues to have some issues swallowing food and water at times  She feels that some of this may be attributed to her ill fitting dentures.  She states that her dentures are approximately 15 years old and she has difficult time keeping them in her mouth.  She is unable to wear adhesives due to reactions to the glue.  She denies any painful swallowing.  She states she has had her thyroid ultrasound many years ago that was normal but does notice some tenderness to this area.  She denies any recent weight gain or loss.           Past Medical History:   Diagnosis Date    Arthritis     Asthma     inhaler prn    Depression     Fibromyalgia     GERD (gastroesophageal reflux disease)     Hyperlipidemia     Hypertension     Hx. No Tx for 1 yr.    Pernicious anemia     Vertigo      Past Surgical History:   Procedure Laterality Date    CHOLECYSTECTOMY      FRACTURE SURGERY      left arm/ plate    GALLBLADDER SURGERY      HYSTERECTOMY (CERVIX STATUS UNKNOWN)      OTHER SURGICAL HISTORY Right 13    chssman-snook procedure, right foot       Current Outpatient Medications:     gemfibrozil (LOPID) 600 MG tablet, , Disp: , Rfl:     azelastine (ASTELIN) 0.1 % nasal spray, 1-2 sprays by Nasal route 2 times daily as needed for Rhinitis Use in each nostril as directed, Disp: 30 mL, Rfl: 1    fluticasone (FLONASE) 50 MCG/ACT nasal spray, 2 sprays by Each Nostril route daily,

## 2024-08-06 ENCOUNTER — HOSPITAL ENCOUNTER (OUTPATIENT)
Dept: ULTRASOUND IMAGING | Age: 59
Discharge: HOME | End: 2024-08-06
Payer: MEDICARE

## 2024-08-06 DIAGNOSIS — R13.10 DYSPHAGIA, UNSPECIFIED TYPE: ICD-10-CM

## 2024-08-06 PROCEDURE — 76536 US EXAM OF HEAD AND NECK: CPT

## 2024-08-11 ENCOUNTER — APPOINTMENT (OUTPATIENT)
Dept: CT IMAGING | Age: 59
End: 2024-08-11
Payer: MEDICARE

## 2024-08-11 ENCOUNTER — APPOINTMENT (OUTPATIENT)
Dept: GENERAL RADIOLOGY | Age: 59
End: 2024-08-11
Payer: MEDICARE

## 2024-08-11 ENCOUNTER — HOSPITAL ENCOUNTER (EMERGENCY)
Age: 59
Discharge: HOME | End: 2024-08-11
Attending: EMERGENCY MEDICINE
Payer: MEDICARE

## 2024-08-11 VITALS
SYSTOLIC BLOOD PRESSURE: 149 MMHG | TEMPERATURE: 97.3 F | BODY MASS INDEX: 45.83 KG/M2 | OXYGEN SATURATION: 100 % | WEIGHT: 267 LBS | RESPIRATION RATE: 20 BRPM | HEART RATE: 85 BPM | DIASTOLIC BLOOD PRESSURE: 95 MMHG

## 2024-08-11 DIAGNOSIS — M25.531 RIGHT WRIST PAIN: ICD-10-CM
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
## 2024-08-11 LAB
ANION GAP SERPL CALCULATED.3IONS-SCNC: 13 MMOL/L (ref 7–16)
BASOPHILS # BLD: 0.04 K/UL (ref 0–0.2)
BASOPHILS NFR BLD: 1 % (ref 0–2)
BUN SERPL-MCNC: 12 MG/DL (ref 6–20)
CALCIUM SERPL-MCNC: 8.9 MG/DL (ref 8.6–10.2)
CHLORIDE SERPL-SCNC: 103 MMOL/L (ref 98–107)
CO2 SERPL-SCNC: 24 MMOL/L (ref 22–29)
CREAT SERPL-MCNC: 0.9 MG/DL (ref 0.5–1)
EOSINOPHIL # BLD: 0.17 K/UL (ref 0.05–0.5)
EOSINOPHILS RELATIVE PERCENT: 2 % (ref 0–6)
ERYTHROCYTE [DISTWIDTH] IN BLOOD BY AUTOMATED COUNT: 14.4 % (ref 11.5–15)
GFR, ESTIMATED: 71 ML/MIN/1.73M2
GLUCOSE SERPL-MCNC: 138 MG/DL (ref 74–99)
HCT VFR BLD AUTO: 37.4 % (ref 34–48)
HGB BLD-MCNC: 11.8 G/DL (ref 11.5–15.5)
IMM GRANULOCYTES # BLD AUTO: 0.03 K/UL (ref 0–0.58)
IMM GRANULOCYTES NFR BLD: 0 % (ref 0–5)
LYMPHOCYTES NFR BLD: 1.46 K/UL (ref 1.5–4)
LYMPHOCYTES RELATIVE PERCENT: 19 % (ref 20–42)
MCH RBC QN AUTO: 25.5 PG (ref 26–35)
MCHC RBC AUTO-ENTMCNC: 31.6 G/DL (ref 32–34.5)
MCV RBC AUTO: 80.8 FL (ref 80–99.9)
MONOCYTES NFR BLD: 0.43 K/UL (ref 0.1–0.95)
MONOCYTES NFR BLD: 6 % (ref 2–12)
NEUTROPHILS NFR BLD: 73 % (ref 43–80)
NEUTS SEG NFR BLD: 5.68 K/UL (ref 1.8–7.3)
PLATELET # BLD AUTO: 286 K/UL (ref 130–450)
PMV BLD AUTO: 10.1 FL (ref 7–12)
POTASSIUM SERPL-SCNC: 3.8 MMOL/L (ref 3.5–5)
RBC # BLD AUTO: 4.63 M/UL (ref 3.5–5.5)
SODIUM SERPL-SCNC: 140 MMOL/L (ref 132–146)
TROPONIN I SERPL HS-MCNC: <6 NG/L (ref 0–9)
WBC OTHER # BLD: 7.8 K/UL (ref 4.5–11.5)

## 2024-08-11 PROCEDURE — 73110 X-RAY EXAM OF WRIST: CPT

## 2024-08-11 PROCEDURE — 84484 ASSAY OF TROPONIN QUANT: CPT

## 2024-08-11 PROCEDURE — 85025 COMPLETE CBC W/AUTO DIFF WBC: CPT

## 2024-08-11 PROCEDURE — 6360000002 HC RX W HCPCS

## 2024-08-11 PROCEDURE — 99285 EMERGENCY DEPT VISIT HI MDM: CPT

## 2024-08-11 PROCEDURE — 93005 ELECTROCARDIOGRAM TRACING: CPT

## 2024-08-11 PROCEDURE — 96374 THER/PROPH/DIAG INJ IV PUSH: CPT

## 2024-08-11 PROCEDURE — 80048 BASIC METABOLIC PNL TOTAL CA: CPT

## 2024-08-11 PROCEDURE — 70450 CT HEAD/BRAIN W/O DYE: CPT

## 2024-08-11 RX ORDER — KETOROLAC TROMETHAMINE 15 MG/ML
15 INJECTION, SOLUTION INTRAMUSCULAR; INTRAVENOUS ONCE
Status: COMPLETED | OUTPATIENT
Start: 2024-08-11 | End: 2024-08-11

## 2024-08-11 RX ADMIN — KETOROLAC TROMETHAMINE 15 MG: 15 INJECTION, SOLUTION INTRAMUSCULAR; INTRAVENOUS at 17:56

## 2024-08-11 ASSESSMENT — PAIN DESCRIPTION - LOCATION: LOCATION: WRIST;HAND

## 2024-08-11 ASSESSMENT — PAIN DESCRIPTION - DESCRIPTORS: DESCRIPTORS: ACHING

## 2024-08-11 ASSESSMENT — LIFESTYLE VARIABLES
HOW OFTEN DO YOU HAVE A DRINK CONTAINING ALCOHOL: NEVER
HOW MANY STANDARD DRINKS CONTAINING ALCOHOL DO YOU HAVE ON A TYPICAL DAY: PATIENT DOES NOT DRINK

## 2024-08-11 ASSESSMENT — PAIN DESCRIPTION - ORIENTATION: ORIENTATION: RIGHT

## 2024-08-11 ASSESSMENT — PAIN SCALES - GENERAL: PAINLEVEL_OUTOF10: 7

## 2024-08-11 NOTE — ED PROVIDER NOTES
IntraVENous Given 8/11/24 8323)           Is this patient to be included in the SEP-1 Core Measure due to severe sepsis or septic shock?   No   Exclusion criteria - the patient is NOT to be included for SEP-1 Core Measure due to:  Infection is not suspected        Medical Decision Making/Differential Diagnosis:    CC/HPI Summary, Social Determinants of health, Records Reviewed, DDx, testing done/not done, ED Course, Reassessment, disposition considerations/shared decision making with patient, consults, disposition:      ED Course as of 08/11/24 2023   Sun Aug 11, 2024   1844   ATTENDING PROVIDER ATTESTATION:     I have personally performed and/or participated in the history, exam, medical decision making, and procedures and agree with all pertinent clinical information unless otherwise noted.    I have also reviewed and agree with the past medical, family and social history unless otherwise noted.    I have discussed this patient in detail with the resident and provided the instruction and education regarding the evidence-based evaluation and treatment of fall.    Any EKG that may have been performed has been personally reviewed by me and I agree with the documentation as noted by the resident.    History: Patient presents to the ED for evaluation of a fall.  Patient states that she twisted on the couch but woke up on the ground.  She states she was least 10 feet from her couch.  She is not sure how she got there.  She denies having any chest pain, shortness breath, dizziness.  Has no complaints right now other than right wrist pain which is a real reason why she came.  Denies any head or neck pain.  Denies back pain.    My findings: Gladis Ross is a 59 y.o. female whom is in no distress. Physical exam reveals patient to be sitting comfortably in chair.  No craniofacial trauma noted.  No tenderness of the cervical, thoracic, lumbar spine.  Abdomen soft nontender.  Heart regular rhythm.  Lungs clear to

## 2024-08-12 LAB
EKG ATRIAL RATE: 72 BPM
EKG P AXIS: 36 DEGREES
EKG P-R INTERVAL: 164 MS
EKG Q-T INTERVAL: 408 MS
EKG QRS DURATION: 64 MS
EKG QTC CALCULATION (BAZETT): 443 MS
EKG R AXIS: -31 DEGREES
EKG T AXIS: -21 DEGREES
EKG VENTRICULAR RATE: 71 BPM

## 2024-08-12 PROCEDURE — 93010 ELECTROCARDIOGRAM REPORT: CPT | Performed by: INTERNAL MEDICINE

## 2024-08-12 NOTE — DISCHARGE INSTRUCTIONS
Call and schedule an appointment with your PCP over the next week as needed.  Apply ice to the wrist, and take tylenol and ibuprofen for your pain  Return to the ER should your symptoms worsen.

## 2024-08-16 ENCOUNTER — TELEPHONE (OUTPATIENT)
Dept: ENT CLINIC | Age: 59
End: 2024-08-16

## 2024-08-16 NOTE — TELEPHONE ENCOUNTER
----- Message from MARTHA Cheney CNP sent at 8/9/2024 12:22 PM EDT -----  Thyroid nodules in both halves of the thyroid, do no meet criteria to biopsy.  Will repeat US in 1 year.  Please advise patient

## 2024-10-18 ENCOUNTER — OFFICE VISIT (OUTPATIENT)
Dept: ENT CLINIC | Age: 59
End: 2024-10-18
Payer: COMMERCIAL

## 2024-10-18 VITALS
DIASTOLIC BLOOD PRESSURE: 86 MMHG | SYSTOLIC BLOOD PRESSURE: 147 MMHG | BODY MASS INDEX: 43.54 KG/M2 | HEART RATE: 90 BPM | WEIGHT: 255 LBS | HEIGHT: 64 IN

## 2024-10-18 DIAGNOSIS — J30.9 ALLERGIC RHINITIS, UNSPECIFIED SEASONALITY, UNSPECIFIED TRIGGER: ICD-10-CM

## 2024-10-18 DIAGNOSIS — R13.10 DYSPHAGIA, UNSPECIFIED TYPE: Primary | ICD-10-CM

## 2024-10-18 DIAGNOSIS — M26.623 BILATERAL TEMPOROMANDIBULAR JOINT PAIN: ICD-10-CM

## 2024-10-18 PROCEDURE — 99213 OFFICE O/P EST LOW 20 MIN: CPT | Performed by: NURSE PRACTITIONER

## 2024-10-18 ASSESSMENT — ENCOUNTER SYMPTOMS
EYES NEGATIVE: 1
SINUS PAIN: 0
COUGH: 0
SINUS PRESSURE: 0
TROUBLE SWALLOWING: 1
SHORTNESS OF BREATH: 0
STRIDOR: 0
RHINORRHEA: 0

## 2024-10-18 NOTE — PROGRESS NOTES
Sulfa antibiotics  Social History     Tobacco Use    Smoking status: Never    Smokeless tobacco: Never   Substance Use Topics    Alcohol use: No    Drug use: No     History reviewed. No pertinent family history.    Review of Systems   Constitutional: Negative.  Negative for activity change and appetite change.   HENT:  Positive for hearing loss, tinnitus and trouble swallowing. Negative for congestion, ear discharge, ear pain, postnasal drip, rhinorrhea, sinus pressure and sinus pain.    Eyes: Negative.    Respiratory:  Negative for cough, shortness of breath and stridor.    Cardiovascular: Negative.  Negative for chest pain and palpitations.   Endocrine: Negative.    Musculoskeletal: Negative.    Skin: Negative.    Neurological: Negative.  Negative for dizziness.   Hematological: Negative.    Psychiatric/Behavioral: Negative.         BP (!) 147/86 (Site: Left Lower Arm, Position: Sitting, Cuff Size: Medium Adult)   Pulse 90   Ht 1.626 m (5' 4\")   Wt 115.7 kg (255 lb)   BMI 43.77 kg/m²   Physical Exam  Constitutional:       Appearance: Normal appearance.   HENT:      Head: Normocephalic.      Jaw: Malocclusion present. No tenderness.      Right Ear: Tympanic membrane, ear canal and external ear normal. Decreased hearing noted.      Left Ear: Tympanic membrane, ear canal and external ear normal. Decreased hearing noted. Tympanic membrane is not retracted.      Nose: No congestion or rhinorrhea.      Right Turbinates: Not pale.      Left Turbinates: Not pale.      Mouth/Throat:      Lips: Pink.      Mouth: Mucous membranes are moist.      Dentition: Does not have dentures.      Pharynx: Oropharynx is clear.   Eyes:      Conjunctiva/sclera: Conjunctivae normal.      Pupils: Pupils are equal, round, and reactive to light.   Cardiovascular:      Rate and Rhythm: Normal rate and regular rhythm.      Pulses: Normal pulses.   Pulmonary:      Effort: Pulmonary effort is normal. No respiratory distress.      Breath

## 2024-11-01 ENCOUNTER — HOSPITAL ENCOUNTER (OUTPATIENT)
Age: 59
Discharge: HOME | End: 2024-11-01
Payer: MEDICARE

## 2024-11-01 LAB
ALBUMIN SERPL-MCNC: 3.7 G/DL (ref 3.5–5.2)
ALP SERPL-CCNC: 121 U/L (ref 35–104)
ALT SERPL-CCNC: 23 U/L (ref 0–32)
ANION GAP SERPL CALCULATED.3IONS-SCNC: 12 MMOL/L (ref 7–16)
AST SERPL-CCNC: 28 U/L (ref 0–31)
BACTERIA URNS QL MICRO: ABNORMAL
BILIRUB SERPL-MCNC: 0.4 MG/DL (ref 0–1.2)
BILIRUB UR QL STRIP: NEGATIVE
BUN SERPL-MCNC: 16 MG/DL (ref 6–20)
CALCIUM SERPL-MCNC: 8.9 MG/DL (ref 8.6–10.2)
CHLORIDE SERPL-SCNC: 102 MMOL/L (ref 98–107)
CHOLEST SERPL-MCNC: 172 MG/DL
CLARITY UR: ABNORMAL
CO2 SERPL-SCNC: 23 MMOL/L (ref 22–29)
COLOR UR: YELLOW
CREAT SERPL-MCNC: 1.3 MG/DL (ref 0.5–1)
EPI CELLS #/AREA URNS HPF: ABNORMAL /HPF
ERYTHROCYTE [DISTWIDTH] IN BLOOD BY AUTOMATED COUNT: 15.3 % (ref 12–16)
GFR, ESTIMATED: 47 ML/MIN/1.73M2
GLUCOSE SERPL-MCNC: 126 MG/DL (ref 74–99)
GLUCOSE UR STRIP-MCNC: NEGATIVE MG/DL
HCT VFR BLD AUTO: 34.4 % (ref 34–48)
HDLC SERPL-MCNC: 28 MG/DL
HGB BLD-MCNC: 10.8 G/DL (ref 11.5–15.5)
HGB UR QL STRIP.AUTO: NEGATIVE
KETONES UR STRIP-MCNC: NEGATIVE MG/DL
LDLC SERPL CALC-MCNC: 69 MG/DL
LEUKOCYTE ESTERASE UR QL STRIP: ABNORMAL
MCH RBC QN AUTO: 24.2 PG (ref 26–35)
MCHC RBC AUTO-ENTMCNC: 31.4 G/DL (ref 32–34.5)
MCV RBC AUTO: 77.1 FL (ref 80–99.9)
NITRITE UR QL STRIP: POSITIVE
PH UR STRIP: 5.5 [PH] (ref 5–9)
PLATELET # BLD AUTO: 241 K/UL (ref 130–450)
PMV BLD AUTO: 9.8 FL (ref 7–12)
POTASSIUM SERPL-SCNC: 3.9 MMOL/L (ref 3.5–5)
PROT SERPL-MCNC: 6.8 G/DL (ref 6.4–8.3)
PROT UR STRIP-MCNC: NEGATIVE MG/DL
RBC # BLD AUTO: 4.46 M/UL (ref 3.5–5.5)
RBC #/AREA URNS HPF: ABNORMAL /HPF
SEND OUT REPORT: NORMAL
SODIUM SERPL-SCNC: 137 MMOL/L (ref 132–146)
SP GR UR STRIP: 1.02 (ref 1–1.03)
TEST NAME: NORMAL
TRIGL SERPL-MCNC: 377 MG/DL
TSH SERPL DL<=0.05 MIU/L-ACNC: 2.03 UIU/ML (ref 0.27–4.2)
UROBILINOGEN UR STRIP-ACNC: 0.2 EU/DL (ref 0–1)
VLDLC SERPL CALC-MCNC: 75 MG/DL
WBC #/AREA URNS HPF: ABNORMAL /HPF
WBC OTHER # BLD: 3.1 K/UL (ref 4.5–11.5)

## 2024-11-01 PROCEDURE — 81001 URINALYSIS AUTO W/SCOPE: CPT

## 2024-11-01 PROCEDURE — 85027 COMPLETE CBC AUTOMATED: CPT

## 2024-11-01 PROCEDURE — 84443 ASSAY THYROID STIM HORMONE: CPT

## 2024-11-01 PROCEDURE — 80061 LIPID PANEL: CPT

## 2024-11-01 PROCEDURE — 36415 COLL VENOUS BLD VENIPUNCTURE: CPT

## 2024-11-01 PROCEDURE — 80053 COMPREHEN METABOLIC PANEL: CPT

## 2024-11-06 LAB
SEND OUT REPORT: NORMAL
TEST NAME: NORMAL

## 2025-01-23 ENCOUNTER — TELEPHONE (OUTPATIENT)
Dept: ENT CLINIC | Age: 60
End: 2025-01-23

## 2025-01-23 NOTE — TELEPHONE ENCOUNTER
Pt called back to make sure we received her message. Told pt appt was cancelled. Pt said she has to talk to her ride and then she will call back to r/s appt. I informed pt we have no soon appt's and it will probably be pushed out a bit. Pt understood

## 2025-01-23 NOTE — TELEPHONE ENCOUNTER
Patient left voicemail requesting return call to r/s 1/23/25 appointment. Please call patient and r/s.

## 2025-05-15 ENCOUNTER — TRANSCRIBE ORDERS (OUTPATIENT)
Dept: ADMINISTRATIVE | Age: 60
End: 2025-05-15

## 2025-05-15 DIAGNOSIS — R60.0 LOCALIZED EDEMA: Primary | ICD-10-CM

## 2025-06-11 ENCOUNTER — HOSPITAL ENCOUNTER (OUTPATIENT)
Dept: ULTRASOUND IMAGING | Age: 60
Discharge: HOME | End: 2025-06-11
Attending: FAMILY MEDICINE
Payer: MEDICARE

## 2025-06-11 DIAGNOSIS — R60.0 LOCALIZED EDEMA: ICD-10-CM

## 2025-06-11 PROCEDURE — 93971 EXTREMITY STUDY: CPT
